# Patient Record
Sex: MALE | Race: WHITE | Employment: OTHER | URBAN - NONMETROPOLITAN AREA
[De-identification: names, ages, dates, MRNs, and addresses within clinical notes are randomized per-mention and may not be internally consistent; named-entity substitution may affect disease eponyms.]

---

## 2017-04-27 ENCOUNTER — NURSE ONLY (OUTPATIENT)
Dept: FAMILY MEDICINE CLINIC | Age: 72
End: 2017-04-27

## 2017-04-27 VITALS — SYSTOLIC BLOOD PRESSURE: 110 MMHG | DIASTOLIC BLOOD PRESSURE: 74 MMHG

## 2017-04-27 DIAGNOSIS — Z01.30 BLOOD PRESSURE CHECK: Primary | ICD-10-CM

## 2017-06-26 ENCOUNTER — OFFICE VISIT (OUTPATIENT)
Dept: FAMILY MEDICINE CLINIC | Age: 72
End: 2017-06-26

## 2017-06-26 VITALS
HEIGHT: 74 IN | HEART RATE: 70 BPM | BODY MASS INDEX: 27.62 KG/M2 | DIASTOLIC BLOOD PRESSURE: 80 MMHG | WEIGHT: 215.2 LBS | SYSTOLIC BLOOD PRESSURE: 132 MMHG

## 2017-06-26 DIAGNOSIS — R73.02 GLUCOSE INTOLERANCE (IMPAIRED GLUCOSE TOLERANCE): ICD-10-CM

## 2017-06-26 DIAGNOSIS — E78.00 PURE HYPERCHOLESTEROLEMIA: Primary | ICD-10-CM

## 2017-06-26 DIAGNOSIS — J02.9 SORE THROAT: ICD-10-CM

## 2017-06-26 PROCEDURE — 1036F TOBACCO NON-USER: CPT | Performed by: FAMILY MEDICINE

## 2017-06-26 PROCEDURE — 1123F ACP DISCUSS/DSCN MKR DOCD: CPT | Performed by: FAMILY MEDICINE

## 2017-06-26 PROCEDURE — 99214 OFFICE O/P EST MOD 30 MIN: CPT | Performed by: FAMILY MEDICINE

## 2017-06-26 PROCEDURE — 4040F PNEUMOC VAC/ADMIN/RCVD: CPT | Performed by: FAMILY MEDICINE

## 2017-06-26 PROCEDURE — 3017F COLORECTAL CA SCREEN DOC REV: CPT | Performed by: FAMILY MEDICINE

## 2017-06-26 PROCEDURE — G8427 DOCREV CUR MEDS BY ELIG CLIN: HCPCS | Performed by: FAMILY MEDICINE

## 2017-06-26 PROCEDURE — G8419 CALC BMI OUT NRM PARAM NOF/U: HCPCS | Performed by: FAMILY MEDICINE

## 2017-06-26 RX ORDER — LOVASTATIN 20 MG/1
20 TABLET ORAL DAILY
Qty: 90 TABLET | Refills: 1 | Status: SHIPPED | OUTPATIENT
Start: 2017-06-26 | End: 2017-12-14 | Stop reason: SDUPTHER

## 2017-06-26 RX ORDER — OMEPRAZOLE 40 MG/1
40 CAPSULE, DELAYED RELEASE ORAL DAILY
Qty: 30 CAPSULE | Refills: 0 | Status: SHIPPED | OUTPATIENT
Start: 2017-06-26 | End: 2017-07-24 | Stop reason: CLARIF

## 2017-06-26 ASSESSMENT — ENCOUNTER SYMPTOMS
SORE THROAT: 1
WHEEZING: 0
NAUSEA: 0
SHORTNESS OF BREATH: 0
TROUBLE SWALLOWING: 0
ABDOMINAL PAIN: 0
VOICE CHANGE: 1

## 2017-06-28 ENCOUNTER — TELEPHONE (OUTPATIENT)
Dept: FAMILY MEDICINE CLINIC | Age: 72
End: 2017-06-28

## 2017-06-28 LAB
AVERAGE GLUCOSE: 108 MG/DL (ref 66–114)
HBA1C MFR BLD: 5.4 % (ref 4.2–5.8)

## 2017-07-24 ENCOUNTER — OFFICE VISIT (OUTPATIENT)
Dept: FAMILY MEDICINE CLINIC | Age: 72
End: 2017-07-24
Payer: COMMERCIAL

## 2017-07-24 VITALS
HEART RATE: 80 BPM | SYSTOLIC BLOOD PRESSURE: 122 MMHG | BODY MASS INDEX: 28.18 KG/M2 | HEIGHT: 74 IN | DIASTOLIC BLOOD PRESSURE: 78 MMHG | WEIGHT: 219.6 LBS

## 2017-07-24 DIAGNOSIS — Z23 NEED FOR PNEUMOCOCCAL VACCINE: ICD-10-CM

## 2017-07-24 DIAGNOSIS — K21.9 GASTROESOPHAGEAL REFLUX DISEASE WITHOUT ESOPHAGITIS: Primary | ICD-10-CM

## 2017-07-24 DIAGNOSIS — J02.9 SORE THROAT: ICD-10-CM

## 2017-07-24 PROCEDURE — 3017F COLORECTAL CA SCREEN DOC REV: CPT | Performed by: FAMILY MEDICINE

## 2017-07-24 PROCEDURE — 90471 IMMUNIZATION ADMIN: CPT | Performed by: FAMILY MEDICINE

## 2017-07-24 PROCEDURE — 99212 OFFICE O/P EST SF 10 MIN: CPT | Performed by: FAMILY MEDICINE

## 2017-07-24 PROCEDURE — 1036F TOBACCO NON-USER: CPT | Performed by: FAMILY MEDICINE

## 2017-07-24 PROCEDURE — 1123F ACP DISCUSS/DSCN MKR DOCD: CPT | Performed by: FAMILY MEDICINE

## 2017-07-24 PROCEDURE — 90670 PCV13 VACCINE IM: CPT | Performed by: FAMILY MEDICINE

## 2017-07-24 PROCEDURE — G8427 DOCREV CUR MEDS BY ELIG CLIN: HCPCS | Performed by: FAMILY MEDICINE

## 2017-07-24 PROCEDURE — 4040F PNEUMOC VAC/ADMIN/RCVD: CPT | Performed by: FAMILY MEDICINE

## 2017-07-24 PROCEDURE — G8419 CALC BMI OUT NRM PARAM NOF/U: HCPCS | Performed by: FAMILY MEDICINE

## 2017-07-24 RX ORDER — ESOMEPRAZOLE MAGNESIUM 40 MG/1
40 CAPSULE, DELAYED RELEASE ORAL
COMMUNITY
End: 2017-12-14 | Stop reason: SDUPTHER

## 2017-12-01 ENCOUNTER — OFFICE VISIT (OUTPATIENT)
Dept: FAMILY MEDICINE CLINIC | Age: 72
End: 2017-12-01
Payer: COMMERCIAL

## 2017-12-01 VITALS
BODY MASS INDEX: 28.53 KG/M2 | DIASTOLIC BLOOD PRESSURE: 76 MMHG | SYSTOLIC BLOOD PRESSURE: 126 MMHG | HEART RATE: 80 BPM | WEIGHT: 222.2 LBS

## 2017-12-01 DIAGNOSIS — L08.9 FINGER INFECTION: Primary | ICD-10-CM

## 2017-12-01 PROCEDURE — 99213 OFFICE O/P EST LOW 20 MIN: CPT | Performed by: FAMILY MEDICINE

## 2017-12-01 RX ORDER — SULFAMETHOXAZOLE AND TRIMETHOPRIM 800; 160 MG/1; MG/1
1 TABLET ORAL 2 TIMES DAILY
Qty: 20 TABLET | Refills: 0 | Status: SHIPPED | OUTPATIENT
Start: 2017-12-01 | End: 2017-12-11

## 2017-12-01 NOTE — PROGRESS NOTES
[facility] PROFESSIONAL SERVS  [facility]  [address] [address]  Dept: [phone]  Dept Fax: [phone]  Loc: [phone]  PROGRESS NOTE      Visit Date: 12/1/2017    Shakila Estrella is a 67 y.o. male who presents today for:  Chief Complaint   Patient presents with    Finger Pain     Right Thumb pain x 1 week, denies injury, feels there may be something in it       Subjective:  HPI     Right thumb pain x 1 week. No injury. Having swelling and worsening pain. No drainage. No fever or chills. Has some redness. He states it is possible their could be a splinter in his finger but does not recall an episode. He tried digging in the tip of his finger last night. No numbness. Review of Systems  Past Medical History:   Diagnosis Date    Arthritis     Erectile dysfunction     Glucose intolerance (impaired glucose tolerance)     Hyperlipidemia     Renal cyst, left 7-2001    Dr. Masood Branham      Past Surgical History:   Procedure Laterality Date    COLONOSCOPY      TONSILLECTOMY      UPPER GASTROINTESTINAL ENDOSCOPY       Family History   Problem Relation Age of Onset    Cancer Mother     Alcohol Abuse Father     Cancer Sister     Diabetes Maternal Grandfather      Social History   Substance Use Topics    Smoking status: Never Smoker    Smokeless tobacco: Never Used    Alcohol use No      Current Outpatient Prescriptions   Medication Sig Dispense Refill    esomeprazole (NEXIUM) 40 MG delayed release capsule Take 40 mg by mouth every morning (before breakfast)      lovastatin (MEVACOR) 20 MG tablet Take 1 tablet by mouth daily 90 tablet 1    acyclovir (ZOVIRAX) 200 MG capsule One po qid prn cold sores 40 capsule 1    aspirin 81 MG tablet Take 81 mg by mouth daily      Naproxen Sodium (ALEVE PO) Take 1 tablet by mouth 2 times daily as needed       No current facility-administered medications for this visit.       No Known Allergies  Health

## 2017-12-14 ENCOUNTER — OFFICE VISIT (OUTPATIENT)
Dept: FAMILY MEDICINE CLINIC | Age: 72
End: 2017-12-14
Payer: COMMERCIAL

## 2017-12-14 VITALS
HEIGHT: 74 IN | HEART RATE: 70 BPM | DIASTOLIC BLOOD PRESSURE: 66 MMHG | WEIGHT: 215 LBS | BODY MASS INDEX: 27.59 KG/M2 | SYSTOLIC BLOOD PRESSURE: 108 MMHG

## 2017-12-14 DIAGNOSIS — E78.00 PURE HYPERCHOLESTEROLEMIA: Primary | ICD-10-CM

## 2017-12-14 DIAGNOSIS — K21.9 GASTROESOPHAGEAL REFLUX DISEASE WITHOUT ESOPHAGITIS: ICD-10-CM

## 2017-12-14 PROCEDURE — G8427 DOCREV CUR MEDS BY ELIG CLIN: HCPCS | Performed by: FAMILY MEDICINE

## 2017-12-14 PROCEDURE — 99213 OFFICE O/P EST LOW 20 MIN: CPT | Performed by: FAMILY MEDICINE

## 2017-12-14 PROCEDURE — 4040F PNEUMOC VAC/ADMIN/RCVD: CPT | Performed by: FAMILY MEDICINE

## 2017-12-14 PROCEDURE — 1123F ACP DISCUSS/DSCN MKR DOCD: CPT | Performed by: FAMILY MEDICINE

## 2017-12-14 PROCEDURE — 3017F COLORECTAL CA SCREEN DOC REV: CPT | Performed by: FAMILY MEDICINE

## 2017-12-14 PROCEDURE — 1036F TOBACCO NON-USER: CPT | Performed by: FAMILY MEDICINE

## 2017-12-14 PROCEDURE — G8417 CALC BMI ABV UP PARAM F/U: HCPCS | Performed by: FAMILY MEDICINE

## 2017-12-14 PROCEDURE — G8484 FLU IMMUNIZE NO ADMIN: HCPCS | Performed by: FAMILY MEDICINE

## 2017-12-14 RX ORDER — ESOMEPRAZOLE MAGNESIUM 40 MG/1
40 CAPSULE, DELAYED RELEASE ORAL
Qty: 90 CAPSULE | Refills: 1 | Status: SHIPPED | OUTPATIENT
Start: 2017-12-14 | End: 2018-06-28 | Stop reason: SDUPTHER

## 2017-12-14 RX ORDER — LOVASTATIN 20 MG/1
20 TABLET ORAL DAILY
Qty: 90 TABLET | Refills: 1 | Status: SHIPPED | OUTPATIENT
Start: 2017-12-14 | End: 2018-03-16 | Stop reason: SDUPTHER

## 2017-12-14 ASSESSMENT — PATIENT HEALTH QUESTIONNAIRE - PHQ9
1. LITTLE INTEREST OR PLEASURE IN DOING THINGS: 0
SUM OF ALL RESPONSES TO PHQ9 QUESTIONS 1 & 2: 0
2. FEELING DOWN, DEPRESSED OR HOPELESS: 0
SUM OF ALL RESPONSES TO PHQ QUESTIONS 1-9: 0

## 2017-12-14 NOTE — PROGRESS NOTES
Maintenance   Topic Date Due    DTaP/Tdap/Td vaccine (1 - Tdap) 06/18/2011    Flu vaccine (1) 09/01/2017    Pneumococcal low/med risk (2 of 2 - PPSV23) 07/24/2018    Lipid screen  09/30/2022    Colon cancer screen colonoscopy  01/27/2026    Zostavax vaccine  Completed       Objective:     /66 (Site: Left Arm, Position: Sitting, Cuff Size: Large Adult)   Pulse 70   Ht 6' 2\" (1.88 m)   Wt 215 lb (97.5 kg)   BMI 27.60 kg/m²   Physical Exam   Constitutional: He is oriented to person, place, and time. He appears well-developed and well-nourished. No distress. Cardiovascular: Normal rate and regular rhythm. Murmur heard. Pulmonary/Chest: Effort normal and breath sounds normal. No respiratory distress. He has no wheezes. Neurological: He is alert and oriented to person, place, and time. Psychiatric: He has a normal mood and affect. His behavior is normal.   Vitals reviewed. Lab Results   Component Value Date    WBC 7.3 09/30/2017    HGB 14.7 09/30/2017    HCT 43.1 09/30/2017     09/30/2017    CHOL 206 (H) 09/30/2017    TRIG 184 (H) 09/30/2017    HDL 71 09/30/2017    ALT 18 09/30/2017    AST 23 09/30/2017     09/30/2017    K 3.8 09/30/2017     09/30/2017    CREATININE 0.81 09/30/2017    BUN 19 09/30/2017    CO2 26 09/30/2017    TSH 2.40 09/27/2014    PSA 0.81 09/30/2017    LABA1C 5.2 09/30/2017       Impression/Plan:  1. Pure hypercholesterolemia  controlled  -refill lovastatin (MEVACOR) 20 MG tablet; Take 1 tablet by mouth daily  Dispense: 90 tablet; Refill: 1    2. Gastroesophageal reflux disease without esophagitis  controlled  -refill esomeprazole (NEXIUM) 40 MG delayed release capsule; Take 1 capsule by mouth every morning (before breakfast)  Dispense: 90 capsule; Refill: 1    Discussed options for OA management:  Ice, nsaid, steroid injections, PT, etc.  He will let us know if symptoms worsen and want more treatment. They voiced understanding.   All questions

## 2018-03-16 ENCOUNTER — TELEPHONE (OUTPATIENT)
Dept: FAMILY MEDICINE CLINIC | Age: 73
End: 2018-03-16

## 2018-03-16 DIAGNOSIS — E78.00 PURE HYPERCHOLESTEROLEMIA: ICD-10-CM

## 2018-03-16 RX ORDER — LOVASTATIN 20 MG/1
20 TABLET ORAL DAILY
Qty: 90 TABLET | Refills: 1 | Status: SHIPPED | OUTPATIENT
Start: 2018-03-16 | End: 2018-11-07 | Stop reason: SDUPTHER

## 2018-04-05 ENCOUNTER — HOSPITAL ENCOUNTER (EMERGENCY)
Age: 73
Discharge: HOME | End: 2018-04-05
Payer: COMMERCIAL

## 2018-04-05 VITALS
OXYGEN SATURATION: 97 % | TEMPERATURE: 97.6 F | SYSTOLIC BLOOD PRESSURE: 121 MMHG | DIASTOLIC BLOOD PRESSURE: 70 MMHG | RESPIRATION RATE: 16 BRPM | HEART RATE: 78 BPM

## 2018-04-05 DIAGNOSIS — L03.011 CELLULITIS OF FINGER OF RIGHT HAND: Primary | ICD-10-CM

## 2018-04-05 PROCEDURE — 90715 TDAP VACCINE 7 YRS/> IM: CPT | Performed by: NURSE PRACTITIONER

## 2018-04-05 PROCEDURE — 87070 CULTURE OTHR SPECIMN AEROBIC: CPT

## 2018-04-05 PROCEDURE — 6360000002 HC RX W HCPCS: Performed by: NURSE PRACTITIONER

## 2018-04-05 PROCEDURE — 99213 OFFICE O/P EST LOW 20 MIN: CPT | Performed by: NURSE PRACTITIONER

## 2018-04-05 PROCEDURE — 99213 OFFICE O/P EST LOW 20 MIN: CPT

## 2018-04-05 PROCEDURE — 87077 CULTURE AEROBIC IDENTIFY: CPT

## 2018-04-05 PROCEDURE — 90471 IMMUNIZATION ADMIN: CPT | Performed by: NURSE PRACTITIONER

## 2018-04-05 PROCEDURE — 10060 I&D ABSCESS SIMPLE/SINGLE: CPT

## 2018-04-05 PROCEDURE — 87184 SC STD DISK METHOD PER PLATE: CPT

## 2018-04-05 PROCEDURE — 87205 SMEAR GRAM STAIN: CPT

## 2018-04-05 RX ORDER — CLINDAMYCIN HYDROCHLORIDE 300 MG/1
300 CAPSULE ORAL 3 TIMES DAILY
Qty: 30 CAPSULE | Refills: 0 | Status: SHIPPED | OUTPATIENT
Start: 2018-04-05 | End: 2018-04-15

## 2018-04-05 RX ADMIN — TETANUS TOXOID, REDUCED DIPHTHERIA TOXOID AND ACELLULAR PERTUSSIS VACCINE, ADSORBED 0.5 ML: 5; 2.5; 8; 8; 2.5 SUSPENSION INTRAMUSCULAR at 17:13

## 2018-04-05 ASSESSMENT — ENCOUNTER SYMPTOMS
BACK PAIN: 0
SORE THROAT: 0
VOMITING: 0
DIARRHEA: 0
RHINORRHEA: 0
COUGH: 0
COLOR CHANGE: 1
EYE DISCHARGE: 0
ABDOMINAL PAIN: 0
CONSTIPATION: 0
SHORTNESS OF BREATH: 0
STRIDOR: 0
NAUSEA: 0
WHEEZING: 0
EYE PAIN: 0

## 2018-04-08 LAB
AEROBIC CULTURE: ABNORMAL
GRAM STAIN RESULT: ABNORMAL
ORGANISM: ABNORMAL

## 2018-04-10 ENCOUNTER — TELEPHONE (OUTPATIENT)
Dept: FAMILY MEDICINE CLINIC | Age: 73
End: 2018-04-10

## 2018-06-28 ENCOUNTER — TELEPHONE (OUTPATIENT)
Dept: FAMILY MEDICINE CLINIC | Age: 73
End: 2018-06-28

## 2018-06-28 ENCOUNTER — OFFICE VISIT (OUTPATIENT)
Dept: FAMILY MEDICINE CLINIC | Age: 73
End: 2018-06-28
Payer: COMMERCIAL

## 2018-06-28 VITALS
HEART RATE: 62 BPM | WEIGHT: 221 LBS | DIASTOLIC BLOOD PRESSURE: 68 MMHG | SYSTOLIC BLOOD PRESSURE: 110 MMHG | BODY MASS INDEX: 28.36 KG/M2 | HEIGHT: 74 IN

## 2018-06-28 DIAGNOSIS — E78.00 PURE HYPERCHOLESTEROLEMIA: Primary | ICD-10-CM

## 2018-06-28 DIAGNOSIS — K21.9 GASTROESOPHAGEAL REFLUX DISEASE WITHOUT ESOPHAGITIS: ICD-10-CM

## 2018-06-28 DIAGNOSIS — Z86.19 HISTORY OF COLD SORES: ICD-10-CM

## 2018-06-28 PROCEDURE — 3017F COLORECTAL CA SCREEN DOC REV: CPT | Performed by: FAMILY MEDICINE

## 2018-06-28 PROCEDURE — 99213 OFFICE O/P EST LOW 20 MIN: CPT | Performed by: FAMILY MEDICINE

## 2018-06-28 PROCEDURE — G8427 DOCREV CUR MEDS BY ELIG CLIN: HCPCS | Performed by: FAMILY MEDICINE

## 2018-06-28 PROCEDURE — G8417 CALC BMI ABV UP PARAM F/U: HCPCS | Performed by: FAMILY MEDICINE

## 2018-06-28 PROCEDURE — 1036F TOBACCO NON-USER: CPT | Performed by: FAMILY MEDICINE

## 2018-06-28 PROCEDURE — 1123F ACP DISCUSS/DSCN MKR DOCD: CPT | Performed by: FAMILY MEDICINE

## 2018-06-28 PROCEDURE — 4040F PNEUMOC VAC/ADMIN/RCVD: CPT | Performed by: FAMILY MEDICINE

## 2018-06-28 RX ORDER — ACYCLOVIR 200 MG/1
400 CAPSULE ORAL 3 TIMES DAILY
Qty: 60 CAPSULE | Refills: 0 | Status: SHIPPED | OUTPATIENT
Start: 2018-06-28 | End: 2020-05-05 | Stop reason: SDUPTHER

## 2018-06-28 RX ORDER — ESOMEPRAZOLE MAGNESIUM 40 MG/1
40 CAPSULE, DELAYED RELEASE ORAL
Qty: 90 CAPSULE | Refills: 1 | Status: SHIPPED | OUTPATIENT
Start: 2018-06-28 | End: 2018-11-07 | Stop reason: SDUPTHER

## 2018-06-28 ASSESSMENT — ENCOUNTER SYMPTOMS
WHEEZING: 0
SHORTNESS OF BREATH: 0

## 2018-10-05 ENCOUNTER — HOSPITAL ENCOUNTER (EMERGENCY)
Dept: GENERAL RADIOLOGY | Age: 73
Discharge: HOME | End: 2018-10-05
Payer: COMMERCIAL

## 2018-10-05 ENCOUNTER — HOSPITAL ENCOUNTER (EMERGENCY)
Age: 73
Discharge: HOME | End: 2018-10-05
Payer: COMMERCIAL

## 2018-10-05 VITALS
OXYGEN SATURATION: 96 % | TEMPERATURE: 97.8 F | RESPIRATION RATE: 14 BRPM | HEART RATE: 73 BPM | SYSTOLIC BLOOD PRESSURE: 135 MMHG | DIASTOLIC BLOOD PRESSURE: 85 MMHG

## 2018-10-05 DIAGNOSIS — S20.212A RIB CONTUSION, LEFT, INITIAL ENCOUNTER: Primary | ICD-10-CM

## 2018-10-05 PROCEDURE — 71101 X-RAY EXAM UNILAT RIBS/CHEST: CPT

## 2018-10-05 PROCEDURE — 99213 OFFICE O/P EST LOW 20 MIN: CPT

## 2018-10-05 PROCEDURE — 99214 OFFICE O/P EST MOD 30 MIN: CPT | Performed by: NURSE PRACTITIONER

## 2018-10-05 RX ORDER — COVID-19 ANTIGEN TEST
KIT MISCELLANEOUS
COMMUNITY

## 2018-10-05 ASSESSMENT — PAIN SCALES - GENERAL: PAINLEVEL_OUTOF10: 5

## 2018-10-05 ASSESSMENT — PAIN DESCRIPTION - FREQUENCY: FREQUENCY: CONTINUOUS

## 2018-10-05 ASSESSMENT — PAIN DESCRIPTION - ORIENTATION: ORIENTATION: LEFT

## 2018-10-05 ASSESSMENT — ENCOUNTER SYMPTOMS
NAUSEA: 0
VOMITING: 0

## 2018-10-05 ASSESSMENT — PAIN DESCRIPTION - PAIN TYPE: TYPE: ACUTE PAIN

## 2018-10-05 ASSESSMENT — PAIN DESCRIPTION - LOCATION: LOCATION: RIB CAGE

## 2018-10-05 ASSESSMENT — PAIN DESCRIPTION - DESCRIPTORS: DESCRIPTORS: PRESSURE

## 2018-10-05 NOTE — ED TRIAGE NOTES
Patient walked to room 7 for left rib pain onset last week, he was bent over picking up a golf ball on a golf cart and bent over into the handle. No other needs.

## 2018-11-05 DIAGNOSIS — E78.00 PURE HYPERCHOLESTEROLEMIA: ICD-10-CM

## 2018-11-05 RX ORDER — LOVASTATIN 20 MG/1
20 TABLET ORAL DAILY
Qty: 90 TABLET | Refills: 1 | OUTPATIENT
Start: 2018-11-05

## 2018-11-05 NOTE — TELEPHONE ENCOUNTER
Madi Bello called requesting a refill on the following medications:  Requested Prescriptions     Pending Prescriptions Disp Refills    lovastatin (MEVACOR) 20 MG tablet 90 tablet 1     Sig: Take 1 tablet by mouth daily     Pharmacy verified:  yes      Date of last visit: 6-28-18  Date of next visit (if applicable): 28/6/9793

## 2018-11-07 ENCOUNTER — OFFICE VISIT (OUTPATIENT)
Dept: FAMILY MEDICINE CLINIC | Age: 73
End: 2018-11-07
Payer: COMMERCIAL

## 2018-11-07 VITALS
WEIGHT: 229 LBS | HEART RATE: 72 BPM | BODY MASS INDEX: 29.39 KG/M2 | HEIGHT: 74 IN | DIASTOLIC BLOOD PRESSURE: 76 MMHG | SYSTOLIC BLOOD PRESSURE: 124 MMHG

## 2018-11-07 DIAGNOSIS — Z23 NEED FOR PNEUMOCOCCAL VACCINATION: ICD-10-CM

## 2018-11-07 DIAGNOSIS — E78.00 PURE HYPERCHOLESTEROLEMIA: Primary | ICD-10-CM

## 2018-11-07 DIAGNOSIS — K21.9 GASTROESOPHAGEAL REFLUX DISEASE WITHOUT ESOPHAGITIS: ICD-10-CM

## 2018-11-07 PROCEDURE — 1101F PT FALLS ASSESS-DOCD LE1/YR: CPT | Performed by: FAMILY MEDICINE

## 2018-11-07 PROCEDURE — 1036F TOBACCO NON-USER: CPT | Performed by: FAMILY MEDICINE

## 2018-11-07 PROCEDURE — 90732 PPSV23 VACC 2 YRS+ SUBQ/IM: CPT | Performed by: FAMILY MEDICINE

## 2018-11-07 PROCEDURE — G8417 CALC BMI ABV UP PARAM F/U: HCPCS | Performed by: FAMILY MEDICINE

## 2018-11-07 PROCEDURE — 3017F COLORECTAL CA SCREEN DOC REV: CPT | Performed by: FAMILY MEDICINE

## 2018-11-07 PROCEDURE — 4040F PNEUMOC VAC/ADMIN/RCVD: CPT | Performed by: FAMILY MEDICINE

## 2018-11-07 PROCEDURE — G8427 DOCREV CUR MEDS BY ELIG CLIN: HCPCS | Performed by: FAMILY MEDICINE

## 2018-11-07 PROCEDURE — 99213 OFFICE O/P EST LOW 20 MIN: CPT | Performed by: FAMILY MEDICINE

## 2018-11-07 PROCEDURE — G8484 FLU IMMUNIZE NO ADMIN: HCPCS | Performed by: FAMILY MEDICINE

## 2018-11-07 PROCEDURE — 1123F ACP DISCUSS/DSCN MKR DOCD: CPT | Performed by: FAMILY MEDICINE

## 2018-11-07 PROCEDURE — 90471 IMMUNIZATION ADMIN: CPT | Performed by: FAMILY MEDICINE

## 2018-11-07 RX ORDER — LOVASTATIN 20 MG/1
20 TABLET ORAL DAILY
Qty: 90 TABLET | Refills: 1 | Status: SHIPPED | OUTPATIENT
Start: 2018-11-07 | End: 2019-05-02 | Stop reason: SDUPTHER

## 2018-11-07 RX ORDER — ESOMEPRAZOLE MAGNESIUM 40 MG/1
40 CAPSULE, DELAYED RELEASE ORAL
Qty: 90 CAPSULE | Refills: 1 | Status: SHIPPED | OUTPATIENT
Start: 2018-11-07 | End: 2019-05-02 | Stop reason: SDUPTHER

## 2018-11-07 ASSESSMENT — PATIENT HEALTH QUESTIONNAIRE - PHQ9
SUM OF ALL RESPONSES TO PHQ QUESTIONS 1-9: 0
SUM OF ALL RESPONSES TO PHQ9 QUESTIONS 1 & 2: 0
1. LITTLE INTEREST OR PLEASURE IN DOING THINGS: 0
2. FEELING DOWN, DEPRESSED OR HOPELESS: 0
SUM OF ALL RESPONSES TO PHQ QUESTIONS 1-9: 0

## 2018-11-07 ASSESSMENT — ENCOUNTER SYMPTOMS
SHORTNESS OF BREATH: 0
WHEEZING: 0

## 2018-11-07 NOTE — PROGRESS NOTES
[facility]  [facility]  [address] [address]  Dept: [phone]  Dept Fax: [phone]  Loc: [phone]  PROGRESS NOTE      Visit Date: 11/7/2018    Vickey Gutierrez is a 67 y.o. male who presents today for:  Chief Complaint   Patient presents with    6 Month Follow-Up    Gastroesophageal Reflux    Hyperlipidemia       Subjective:  HPI     6 month follow-up hyperlipidemia:  He is on lovastatin. Also, he takes aspirin. No myalgias. GERD:  On nexium as needed. He takes it as needed       He asks that I do a rectal exam and palpate his prostate. Review of Systems   Respiratory: Negative for shortness of breath and wheezing. Cardiovascular: Negative for chest pain and leg swelling. Past Medical History:   Diagnosis Date    Arthritis     Erectile dysfunction     Glucose intolerance (impaired glucose tolerance)     Hyperlipidemia     Renal cyst, left 7-2001    Dr. Emeka Villasenor      Past Surgical History:   Procedure Laterality Date    COLONOSCOPY      TONSILLECTOMY      UPPER GASTROINTESTINAL ENDOSCOPY       Family History   Problem Relation Age of Onset    Cancer Mother     Alcohol Abuse Father     Cancer Sister     Diabetes Maternal Grandfather      Social History   Substance Use Topics    Smoking status: Never Smoker    Smokeless tobacco: Never Used    Alcohol use No      Current Outpatient Prescriptions   Medication Sig Dispense Refill    Naproxen Sodium (ALEVE) 220 MG CAPS Take by mouth      esomeprazole (NEXIUM) 40 MG delayed release capsule Take 1 capsule by mouth every morning (before breakfast) 90 capsule 1    lovastatin (MEVACOR) 20 MG tablet Take 1 tablet by mouth daily 90 tablet 1    aspirin 81 MG tablet Take 81 mg by mouth daily       No current facility-administered medications for this visit.       No Known Allergies  Health Maintenance   Topic Date Due    Shingles Vaccine (1 of 2 - 2 Dose Series)

## 2019-04-07 PROBLEM — R73.03 PRE-DIABETES: Status: ACTIVE | Noted: 2019-04-07

## 2019-05-02 ENCOUNTER — OFFICE VISIT (OUTPATIENT)
Dept: FAMILY MEDICINE CLINIC | Age: 74
End: 2019-05-02
Payer: COMMERCIAL

## 2019-05-02 VITALS
HEIGHT: 74 IN | WEIGHT: 228.6 LBS | BODY MASS INDEX: 29.34 KG/M2 | HEART RATE: 72 BPM | SYSTOLIC BLOOD PRESSURE: 132 MMHG | DIASTOLIC BLOOD PRESSURE: 80 MMHG

## 2019-05-02 DIAGNOSIS — Z91.81 AT HIGH RISK FOR FALLS: ICD-10-CM

## 2019-05-02 DIAGNOSIS — S76.301A RIGHT HAMSTRING INJURY, INITIAL ENCOUNTER: ICD-10-CM

## 2019-05-02 DIAGNOSIS — K21.9 GASTROESOPHAGEAL REFLUX DISEASE WITHOUT ESOPHAGITIS: ICD-10-CM

## 2019-05-02 DIAGNOSIS — R73.03 PRE-DIABETES: ICD-10-CM

## 2019-05-02 DIAGNOSIS — E78.00 PURE HYPERCHOLESTEROLEMIA: Primary | ICD-10-CM

## 2019-05-02 DIAGNOSIS — N52.8 OTHER MALE ERECTILE DYSFUNCTION: ICD-10-CM

## 2019-05-02 PROCEDURE — 4040F PNEUMOC VAC/ADMIN/RCVD: CPT | Performed by: FAMILY MEDICINE

## 2019-05-02 PROCEDURE — G8417 CALC BMI ABV UP PARAM F/U: HCPCS | Performed by: FAMILY MEDICINE

## 2019-05-02 PROCEDURE — 99214 OFFICE O/P EST MOD 30 MIN: CPT | Performed by: FAMILY MEDICINE

## 2019-05-02 PROCEDURE — 1123F ACP DISCUSS/DSCN MKR DOCD: CPT | Performed by: FAMILY MEDICINE

## 2019-05-02 PROCEDURE — 1036F TOBACCO NON-USER: CPT | Performed by: FAMILY MEDICINE

## 2019-05-02 PROCEDURE — 3017F COLORECTAL CA SCREEN DOC REV: CPT | Performed by: FAMILY MEDICINE

## 2019-05-02 PROCEDURE — G8427 DOCREV CUR MEDS BY ELIG CLIN: HCPCS | Performed by: FAMILY MEDICINE

## 2019-05-02 RX ORDER — SILDENAFIL 50 MG/1
50 TABLET, FILM COATED ORAL DAILY PRN
Qty: 10 TABLET | Refills: 0 | Status: SHIPPED | OUTPATIENT
Start: 2019-05-02 | End: 2020-11-27

## 2019-05-02 RX ORDER — ESOMEPRAZOLE MAGNESIUM 40 MG/1
40 CAPSULE, DELAYED RELEASE ORAL
Qty: 90 CAPSULE | Refills: 1 | Status: SHIPPED | OUTPATIENT
Start: 2019-05-02 | End: 2019-11-04 | Stop reason: SDUPTHER

## 2019-05-02 RX ORDER — LOVASTATIN 20 MG/1
20 TABLET ORAL DAILY
Qty: 90 TABLET | Refills: 1 | Status: SHIPPED | OUTPATIENT
Start: 2019-05-02 | End: 2019-11-04 | Stop reason: SDUPTHER

## 2019-05-02 ASSESSMENT — PATIENT HEALTH QUESTIONNAIRE - PHQ9
2. FEELING DOWN, DEPRESSED OR HOPELESS: 0
SUM OF ALL RESPONSES TO PHQ QUESTIONS 1-9: 0
SUM OF ALL RESPONSES TO PHQ QUESTIONS 1-9: 0
SUM OF ALL RESPONSES TO PHQ9 QUESTIONS 1 & 2: 0
1. LITTLE INTEREST OR PLEASURE IN DOING THINGS: 0

## 2019-05-02 ASSESSMENT — ENCOUNTER SYMPTOMS
WHEEZING: 0
SHORTNESS OF BREATH: 0

## 2019-05-02 NOTE — PROGRESS NOTES
[facility] PROFESSIONAL SERVS  [facility]  [address] [address]  Dept: [phone]  Dept Fax: [phone]  Loc: [phone]  PROGRESS NOTE      Visit Date: 5/2/2019    Brett Sorensen is a 75 y.o. male who presents today for:  Chief Complaint   Patient presents with    Hyperlipidemia     6 month check up       Subjective:  Hyperlipidemia   Pertinent negatives include no chest pain or shortness of breath. 6 month follow-up hyperlipidemia:  He is on lovastatin. Also, he takes aspirin. No myalgias. LDL was 86 in April. GERD:  On nexium as needed. He takes it as needed   No abd pain or nausea. Pre-diabetes:  a1c 5.9% in April. New problems:  right buttock pain that started after last night when he fell playing pickleball. He has stretched.      erectile dysfunction: He is unable to obtain an erection. He has not tried medication in the past.  He denies any chest pain. He wants to know his options. Review of Systems   Respiratory: Negative for shortness of breath and wheezing. Cardiovascular: Negative for chest pain and leg swelling. Neurological: Negative for dizziness and syncope.      Past Medical History:   Diagnosis Date    Arthritis     Erectile dysfunction     Glucose intolerance (impaired glucose tolerance)     Hyperlipidemia     Renal cyst, left 7-2001    Dr. Dipti Brooks      Past Surgical History:   Procedure Laterality Date    COLONOSCOPY      TONSILLECTOMY      UPPER GASTROINTESTINAL ENDOSCOPY       Family History   Problem Relation Age of Onset    Cancer Mother     Alcohol Abuse Father     Cancer Sister     Diabetes Maternal Grandfather      Social History     Tobacco Use    Smoking status: Never Smoker    Smokeless tobacco: Never Used   Substance Use Topics    Alcohol use: No      Current Outpatient Medications   Medication Sig Dispense Refill    esomeprazole (NEXIUM) 40 MG delayed release capsule Take 1 capsule by mouth every morning (before breakfast) 90 capsule 1    lovastatin (MEVACOR) 20 MG tablet Take 1 tablet by mouth daily 90 tablet 1    Naproxen Sodium (ALEVE) 220 MG CAPS Take by mouth      aspirin 81 MG tablet Take 81 mg by mouth daily       No current facility-administered medications for this visit. No Known Allergies  Health Maintenance   Topic Date Due    Shingles Vaccine (1 of 2) 11/28/1994    A1C test (Diabetic or Prediabetic)  04/06/2020    Lipid screen  04/06/2024    Colon cancer screen colonoscopy  01/27/2026    DTaP/Tdap/Td vaccine (2 - Td) 04/05/2028    Flu vaccine  Completed    Pneumococcal 65+ years Vaccine  Completed       Objective:  /80 (Site: Right Upper Arm, Position: Sitting, Cuff Size: Medium Adult)   Pulse 72   Ht 6' 2\" (1.88 m)   Wt 228 lb 9.6 oz (103.7 kg)   BMI 29.35 kg/m²   Physical Exam   Constitutional: He is oriented to person, place, and time. He appears well-developed and well-nourished. No distress. Cardiovascular: Normal rate and regular rhythm. No murmur heard. Pulmonary/Chest: Effort normal and breath sounds normal. No respiratory distress. He has no wheezes. Neurological: He is alert and oriented to person, place, and time. Psychiatric: He has a normal mood and affect. His behavior is normal.   Vitals reviewed. Right hip:  ttp of ischial tuberosity. 4+/5 knee flexion strength. Lab Results   Component Value Date    WBC 4.9 04/06/2019    HGB 15.4 04/06/2019    HCT 45.5 04/06/2019     04/06/2019    CHOL 181 04/06/2019    TRIG 123 04/06/2019    HDL 70 04/06/2019    ALT 23 04/06/2019    AST 23 04/06/2019     04/06/2019    K 4.1 04/06/2019     04/06/2019    CREATININE 0.94 04/06/2019    BUN 22 (H) 04/06/2019    CO2 24 04/06/2019    TSH 2.40 09/27/2014    PSA 0.49 04/06/2019    LABA1C 5.9 04/06/2019     Impression/Plan:  1. Pure hypercholesterolemia  Well-controlled. Chronic condition. Refill medication(s).   LDL

## 2019-05-02 NOTE — PATIENT INSTRUCTIONS
Patient Education        Hamstring Strain: Rehab Exercises  Your Care Instructions  Here are some examples of typical rehabilitation exercises for your condition. Start each exercise slowly. Ease off the exercise if you start to have pain. Your doctor or physical therapist will tell you when you can start these exercises and which ones will work best for you. How to do the exercises  Hamstring set (heel dig)    1. Sit with your affected leg bent. Your good leg should be straight and supported on the floor. 2. Tighten the muscles on the back of your bent leg (hamstring) by pressing your heel into the floor. 3. Hold for about 6 seconds, and then rest for up to 10 seconds. 4. Repeat 8 to 12 times. Hamstring curl    1. Lie on your stomach with your knees straight. Place a pillow under your stomach. If your kneecap is uncomfortable, roll up a washcloth and put it under your leg just above your kneecap. 2. Lift the foot of your affected leg by bending your knee so that you bring your foot up toward your buttock. If this motion hurts, try it without bending your knee quite as far. This may help you avoid any painful motion. 3. Slowly move your leg up and down. 4. Repeat 8 to 12 times. 5. When you can do this exercise with ease and no pain, add some resistance. To do this:  6. Tie the ends of an exercise band together to form a loop. Attach one end of the loop to a secure object or shut a door on it to hold it in place. (Or you can have someone hold one end of the loop to provide resistance.)  7. Loop the other end of the exercise band around the lower part of your affected leg. 8. Repeat steps 1 through 4, slowly pulling back on the exercise band with your leg. Hip extension    1. Stand facing a wall with your hands on the wall at about chest level. 2. Keeping the knee of your affected leg straight, kick that leg straight back behind you.   3. Relax, and lower your leg back to the starting seconds. 4. Repeat 2 to 4 times. 5. Repeat steps 1 through 4, but this time keep your back knee bent. Single-leg balance    1. Stand on a flat surface with your arms stretched out to your sides like you are making the letter \"T. \" Then lift your good leg off the floor, bending it at the knee. If you are not steady on your feet, use one hand to hold on to a chair, counter, or wall. 2. Standing on your affected leg, keep that knee straight. Try to balance on that leg for up to 30 seconds. Then rest for up to 10 seconds. 3. Repeat 6 to 8 times. 4. When you can balance on your affected leg for 30 seconds with your eyes open, try to balance on it with your eyes closed. 5. When you can do this exercise with your eyes closed for 30 seconds and with ease and no pain, try standing on a pillow or piece of foam, and repeat steps 1 through 4. Follow-up care is a key part of your treatment and safety. Be sure to make and go to all appointments, and call your doctor if you are having problems. It's also a good idea to know your test results and keep a list of the medicines you take. Where can you learn more? Go to https://[employer]eb.[employer] org and sign in to your Pick1 account. Enter [phone] in the [employer] box to learn more about \"Hamstring Strain: Rehab Exercises. \"     If you do not have an account, please click on the \"Sign Up Now\" link. Current as of: September 20, 2018  Content Version: 11.9  © 6776-1494 [employer]. Care instructions adapted under license by [facility] ([facility]). If you have questions about a medical condition or this instruction, always ask your healthcare professional. [address] any warranty or liability for your use of this information.

## 2019-09-18 ENCOUNTER — OFFICE VISIT (OUTPATIENT)
Dept: FAMILY MEDICINE CLINIC | Age: 74
End: 2019-09-18
Payer: COMMERCIAL

## 2019-09-18 VITALS
DIASTOLIC BLOOD PRESSURE: 80 MMHG | HEIGHT: 74 IN | SYSTOLIC BLOOD PRESSURE: 128 MMHG | HEART RATE: 76 BPM | BODY MASS INDEX: 29.26 KG/M2 | WEIGHT: 228 LBS

## 2019-09-18 DIAGNOSIS — R07.81 RIB PAIN ON RIGHT SIDE: Primary | ICD-10-CM

## 2019-09-18 DIAGNOSIS — M54.6 ACUTE RIGHT-SIDED THORACIC BACK PAIN: ICD-10-CM

## 2019-09-18 PROCEDURE — 99213 OFFICE O/P EST LOW 20 MIN: CPT | Performed by: FAMILY MEDICINE

## 2019-09-18 PROCEDURE — 1123F ACP DISCUSS/DSCN MKR DOCD: CPT | Performed by: FAMILY MEDICINE

## 2019-09-18 PROCEDURE — G8427 DOCREV CUR MEDS BY ELIG CLIN: HCPCS | Performed by: FAMILY MEDICINE

## 2019-09-18 PROCEDURE — 3017F COLORECTAL CA SCREEN DOC REV: CPT | Performed by: FAMILY MEDICINE

## 2019-09-18 PROCEDURE — 4040F PNEUMOC VAC/ADMIN/RCVD: CPT | Performed by: FAMILY MEDICINE

## 2019-09-18 PROCEDURE — G8417 CALC BMI ABV UP PARAM F/U: HCPCS | Performed by: FAMILY MEDICINE

## 2019-09-18 PROCEDURE — 1036F TOBACCO NON-USER: CPT | Performed by: FAMILY MEDICINE

## 2019-09-18 NOTE — PATIENT INSTRUCTIONS
Patient Education        Back Stretches: Exercises  Introduction  Here are some examples of exercises for stretching your back. Start each exercise slowly. Ease off the exercise if you start to have pain. Your doctor or physical therapist will tell you when you can start these exercises and which ones will work best for you. How to do the exercises  Overhead stretch    1. Stand comfortably with your feet shoulder-width apart. 2. Looking straight ahead, raise both arms over your head and reach toward the ceiling. Do not allow your head to tilt back. 3. Hold for 15 to 30 seconds, then lower your arms to your sides. 4. Repeat 2 to 4 times. Side stretch    1. Stand comfortably with your feet shoulder-width apart. 2. Raise one arm over your head, and then lean to the other side. 3. Slide your hand down your leg as you let the weight of your arm gently stretch your side muscles. Hold for 15 to 30 seconds. 4. Repeat 2 to 4 times on each side. Press-up    1. Lie on your stomach, supporting your body with your forearms. 2. Press your elbows down into the floor to raise your upper back. As you do this, relax your stomach muscles and allow your back to arch without using your back muscles. As your press up, do not let your hips or pelvis come off the floor. 3. Hold for 15 to 30 seconds, then relax. 4. Repeat 2 to 4 times. Relax and rest    1. Lie on your back with a rolled towel under your neck and a pillow under your knees. Extend your arms comfortably to your sides. 2. Relax and breathe normally. 3. Remain in this position for about 10 minutes. 4. If you can, do this 2 or 3 times each day. Follow-up care is a key part of your treatment and safety. Be sure to make and go to all appointments, and call your doctor if you are having problems. It's also a good idea to know your test results and keep a list of the medicines you take. Where can you learn more?   Go to

## 2019-09-28 LAB
ALBUMIN SERPL-MCNC: 4.5 G/DL
ALP BLD-CCNC: 67 U/L
ALT SERPL-CCNC: 15 U/L
ANION GAP SERPL CALCULATED.3IONS-SCNC: 10 MMOL/L
AST SERPL-CCNC: 17 U/L
AVERAGE GLUCOSE: 120
BASOPHILS ABSOLUTE: 0 /ΜL
BASOPHILS RELATIVE PERCENT: 0.8 %
BILIRUB SERPL-MCNC: 0.1 MG/DL (ref 0.1–1.4)
BUN BLDV-MCNC: 18 MG/DL
CALCIUM SERPL-MCNC: 9.7 MG/DL
CHLORIDE BLD-SCNC: 104 MMOL/L
CHOLESTEROL, TOTAL: 187 MG/DL
CHOLESTEROL/HDL RATIO: 3
CO2: 26 MMOL/L
CREAT SERPL-MCNC: 0.84 MG/DL
EOSINOPHILS ABSOLUTE: 200 /ΜL
EOSINOPHILS RELATIVE PERCENT: 2.8 %
GFR CALCULATED: >60
GLUCOSE BLD-MCNC: 108 MG/DL
HBA1C MFR BLD: 5.8 %
HCT VFR BLD CALC: 45.6 % (ref 41–53)
HDLC SERPL-MCNC: 62 MG/DL (ref 35–70)
HEMOGLOBIN: 15.5 G/DL (ref 13.5–17.5)
LDL CHOLESTEROL CALCULATED: 103 MG/DL (ref 0–160)
LYMPHOCYTES ABSOLUTE: 1600 /ΜL
LYMPHOCYTES RELATIVE PERCENT: 28.8 %
MCH RBC QN AUTO: 32.2 PG
MCHC RBC AUTO-ENTMCNC: 34 G/DL
MCV RBC AUTO: 94.9 FL
MONOCYTES ABSOLUTE: 400 /ΜL
MONOCYTES RELATIVE PERCENT: 7.1 %
NEUTROPHILS ABSOLUTE: 3300 /ΜL
NEUTROPHILS RELATIVE PERCENT: 60.5 %
PDW BLD-RTO: 13.2 %
PLATELET # BLD: 212 K/ΜL
PMV BLD AUTO: NORMAL FL
POTASSIUM SERPL-SCNC: 4.1 MMOL/L
PROSTATE SPECIFIC ANTIGEN: 0.58 NG/ML
RBC # BLD: 4.8 10^6/ΜL
SODIUM BLD-SCNC: 140 MMOL/L
TOTAL PROTEIN: 7.2
TRIGL SERPL-MCNC: 108 MG/DL
VLDLC SERPL CALC-MCNC: 21 MG/DL
WBC # BLD: 5.4 10^3/ML

## 2019-10-18 ENCOUNTER — NURSE ONLY (OUTPATIENT)
Dept: FAMILY MEDICINE CLINIC | Age: 74
End: 2019-10-18
Payer: COMMERCIAL

## 2019-10-18 DIAGNOSIS — Z23 NEED FOR SHINGLES VACCINE: Primary | ICD-10-CM

## 2019-10-18 PROCEDURE — 90471 IMMUNIZATION ADMIN: CPT | Performed by: FAMILY MEDICINE

## 2019-10-18 PROCEDURE — 90750 HZV VACC RECOMBINANT IM: CPT | Performed by: FAMILY MEDICINE

## 2019-11-04 ENCOUNTER — OFFICE VISIT (OUTPATIENT)
Dept: FAMILY MEDICINE CLINIC | Age: 74
End: 2019-11-04
Payer: COMMERCIAL

## 2019-11-04 VITALS
SYSTOLIC BLOOD PRESSURE: 132 MMHG | HEART RATE: 61 BPM | DIASTOLIC BLOOD PRESSURE: 82 MMHG | HEIGHT: 74 IN | BODY MASS INDEX: 28.75 KG/M2 | WEIGHT: 224 LBS

## 2019-11-04 DIAGNOSIS — Z23 INFLUENZA VACCINE NEEDED: ICD-10-CM

## 2019-11-04 DIAGNOSIS — K21.9 GASTROESOPHAGEAL REFLUX DISEASE WITHOUT ESOPHAGITIS: Primary | ICD-10-CM

## 2019-11-04 DIAGNOSIS — R10.9 BILATERAL FLANK PAIN: ICD-10-CM

## 2019-11-04 DIAGNOSIS — E78.00 PURE HYPERCHOLESTEROLEMIA: ICD-10-CM

## 2019-11-04 PROCEDURE — 90653 IIV ADJUVANT VACCINE IM: CPT | Performed by: FAMILY MEDICINE

## 2019-11-04 PROCEDURE — 4040F PNEUMOC VAC/ADMIN/RCVD: CPT | Performed by: FAMILY MEDICINE

## 2019-11-04 PROCEDURE — 1123F ACP DISCUSS/DSCN MKR DOCD: CPT | Performed by: FAMILY MEDICINE

## 2019-11-04 PROCEDURE — 90471 IMMUNIZATION ADMIN: CPT | Performed by: FAMILY MEDICINE

## 2019-11-04 PROCEDURE — G8417 CALC BMI ABV UP PARAM F/U: HCPCS | Performed by: FAMILY MEDICINE

## 2019-11-04 PROCEDURE — 3288F FALL RISK ASSESSMENT DOCD: CPT | Performed by: FAMILY MEDICINE

## 2019-11-04 PROCEDURE — G8427 DOCREV CUR MEDS BY ELIG CLIN: HCPCS | Performed by: FAMILY MEDICINE

## 2019-11-04 PROCEDURE — 99214 OFFICE O/P EST MOD 30 MIN: CPT | Performed by: FAMILY MEDICINE

## 2019-11-04 PROCEDURE — G8482 FLU IMMUNIZE ORDER/ADMIN: HCPCS | Performed by: FAMILY MEDICINE

## 2019-11-04 PROCEDURE — 0518F FALL PLAN OF CARE DOCD: CPT | Performed by: FAMILY MEDICINE

## 2019-11-04 PROCEDURE — 1036F TOBACCO NON-USER: CPT | Performed by: FAMILY MEDICINE

## 2019-11-04 PROCEDURE — 3017F COLORECTAL CA SCREEN DOC REV: CPT | Performed by: FAMILY MEDICINE

## 2019-11-04 RX ORDER — LOVASTATIN 20 MG/1
20 TABLET ORAL DAILY
Qty: 90 TABLET | Refills: 1 | Status: SHIPPED | OUTPATIENT
Start: 2019-11-04 | End: 2020-05-26 | Stop reason: SDUPTHER

## 2019-11-04 RX ORDER — ESOMEPRAZOLE MAGNESIUM 40 MG/1
40 CAPSULE, DELAYED RELEASE ORAL
Qty: 90 CAPSULE | Refills: 1 | Status: SHIPPED | OUTPATIENT
Start: 2019-11-04 | End: 2020-05-26 | Stop reason: SDUPTHER

## 2019-11-04 ASSESSMENT — ENCOUNTER SYMPTOMS
SHORTNESS OF BREATH: 0
WHEEZING: 0

## 2019-11-05 ENCOUNTER — TELEPHONE (OUTPATIENT)
Dept: FAMILY MEDICINE CLINIC | Age: 74
End: 2019-11-05

## 2019-11-05 DIAGNOSIS — R10.9 BILATERAL FLANK PAIN: Primary | ICD-10-CM

## 2019-11-06 DIAGNOSIS — R10.9 BILATERAL FLANK PAIN: ICD-10-CM

## 2019-11-06 LAB
BACTERIA: NORMAL
BILIRUBIN URINE: NEGATIVE
BLOOD, URINE: NEGATIVE
CASTS: NORMAL /LPF
CASTS: NORMAL /LPF
CHARACTER, URINE: CLEAR
COLOR: YELLOW
CRYSTALS: NORMAL
EPITHELIAL CELLS, UA: NORMAL /HPF
GLUCOSE, URINE: NEGATIVE MG/DL
KETONES, URINE: NEGATIVE
LEUKOCYTE ESTERASE, URINE: NEGATIVE
MISCELLANEOUS LAB TEST RESULT: NORMAL
NITRITE, URINE: NEGATIVE
PH UA: 6 (ref 5–9)
PROTEIN UA: NEGATIVE MG/DL
RBC URINE: NORMAL /HPF
RENAL EPITHELIAL, UA: NORMAL
SPECIFIC GRAVITY UA: 1.02 (ref 1–1.03)
UROBILINOGEN, URINE: 0.2 EU/DL (ref 0–1)
WBC UA: NORMAL /HPF
YEAST: NORMAL

## 2019-11-07 ENCOUNTER — HOSPITAL ENCOUNTER (OUTPATIENT)
Dept: ULTRASOUND IMAGING | Age: 74
Discharge: HOME | End: 2019-11-07
Payer: COMMERCIAL

## 2019-11-07 DIAGNOSIS — R10.9 BILATERAL FLANK PAIN: ICD-10-CM

## 2019-11-07 PROBLEM — N28.1 RENAL CYSTS, ACQUIRED, BILATERAL: Status: ACTIVE | Noted: 2019-11-07

## 2019-11-07 PROCEDURE — 76770 US EXAM ABDO BACK WALL COMP: CPT

## 2019-11-08 ENCOUNTER — TELEPHONE (OUTPATIENT)
Dept: FAMILY MEDICINE CLINIC | Age: 74
End: 2019-11-08

## 2019-11-08 DIAGNOSIS — R10.9 BILATERAL FLANK PAIN: Primary | ICD-10-CM

## 2020-01-15 ENCOUNTER — NURSE ONLY (OUTPATIENT)
Dept: FAMILY MEDICINE CLINIC | Age: 75
End: 2020-01-15
Payer: COMMERCIAL

## 2020-01-15 PROCEDURE — 90471 IMMUNIZATION ADMIN: CPT | Performed by: FAMILY MEDICINE

## 2020-01-15 PROCEDURE — 90750 HZV VACC RECOMBINANT IM: CPT | Performed by: FAMILY MEDICINE

## 2020-01-15 NOTE — PROGRESS NOTES
After obtaining consent, and per orders of Jayda Alonzo MD, Immunization(s) given during visit:    Immunizations Administered     Name Date Dose Route    Zoster Recombinant (Shingrix) 1/15/2020 0.5 mL Intramuscular    Site: Deltoid- Left    Lot: [id number]    NDC: 79615-531-80

## 2020-05-04 ENCOUNTER — TELEPHONE (OUTPATIENT)
Dept: FAMILY MEDICINE CLINIC | Age: 75
End: 2020-05-04

## 2020-05-04 NOTE — TELEPHONE ENCOUNTER
Pt states he used to take a medication to decrease canker sores, pt states he is getting them for whatever reason and would like the medication again [facility]acy [facility]

## 2020-05-05 RX ORDER — ACYCLOVIR 200 MG/1
400 CAPSULE ORAL 3 TIMES DAILY
Qty: 60 CAPSULE | Refills: 0 | Status: SHIPPED | OUTPATIENT
Start: 2020-05-05 | End: 2020-06-17 | Stop reason: SDUPTHER

## 2020-05-26 ENCOUNTER — VIRTUAL VISIT (OUTPATIENT)
Dept: FAMILY MEDICINE CLINIC | Age: 75
End: 2020-05-26
Payer: COMMERCIAL

## 2020-05-26 PROCEDURE — 0518F FALL PLAN OF CARE DOCD: CPT | Performed by: FAMILY MEDICINE

## 2020-05-26 PROCEDURE — 99214 OFFICE O/P EST MOD 30 MIN: CPT | Performed by: FAMILY MEDICINE

## 2020-05-26 PROCEDURE — 3288F FALL RISK ASSESSMENT DOCD: CPT | Performed by: FAMILY MEDICINE

## 2020-05-26 PROCEDURE — 3017F COLORECTAL CA SCREEN DOC REV: CPT | Performed by: FAMILY MEDICINE

## 2020-05-26 PROCEDURE — 4040F PNEUMOC VAC/ADMIN/RCVD: CPT | Performed by: FAMILY MEDICINE

## 2020-05-26 PROCEDURE — 1123F ACP DISCUSS/DSCN MKR DOCD: CPT | Performed by: FAMILY MEDICINE

## 2020-05-26 PROCEDURE — G8427 DOCREV CUR MEDS BY ELIG CLIN: HCPCS | Performed by: FAMILY MEDICINE

## 2020-05-26 RX ORDER — LOVASTATIN 20 MG/1
20 TABLET ORAL DAILY
Qty: 90 TABLET | Refills: 1 | Status: SHIPPED | OUTPATIENT
Start: 2020-05-26 | End: 2020-11-27 | Stop reason: SDUPTHER

## 2020-05-26 RX ORDER — ESOMEPRAZOLE MAGNESIUM 40 MG/1
40 CAPSULE, DELAYED RELEASE ORAL
Qty: 90 CAPSULE | Refills: 1 | Status: SHIPPED | OUTPATIENT
Start: 2020-05-26 | End: 2020-11-27 | Stop reason: SDUPTHER

## 2020-05-26 ASSESSMENT — ENCOUNTER SYMPTOMS
ABDOMINAL PAIN: 0
SHORTNESS OF BREATH: 0
BLOOD IN STOOL: 0
WHEEZING: 0
SORE THROAT: 0

## 2020-05-26 NOTE — PROGRESS NOTES
2020    TELEHEALTH EVALUATION -- Audio/Visual (During [facility] [place] public health emergency)    HPI:    Silva Sanchez (:  1945) has requested an audio/video evaluation for the following concern(s):    6 month f/u    hyperlipidemia:  He is on lovastatin. Also, he takes aspirin. No myalgias. LDL was 103 in sept     GERD:  On nexium as needed. He has not taken nexium lately. No abd pain or nausea.        New problems:  1. Sometimes his throat bothers him. No symptoms currently. He attributes it to not taking nexium. 2.  Left knee pain: Gets left knee pain with exercise. Reports some clicking. Plays golf. He has small amount of pain when it clicks. Pain is on medial aspect of left knee. No injury. Worse when going down stairs. Intentional wt loss. Wt is about 200 lbs. Review of Systems   HENT: Negative for sore throat. Respiratory: Negative for shortness of breath and wheezing. Cardiovascular: Negative for chest pain and leg swelling. Gastrointestinal: Negative for abdominal pain and blood in stool. Musculoskeletal: Positive for arthralgias. Negative for joint swelling. Prior to Visit Medications    Medication Sig Taking?  Authorizing Provider   lovastatin (MEVACOR) 20 MG tablet Take 1 tablet by mouth daily Yes Gustavo Mai MD   Naproxen Sodium (ALEVE) 220 MG CAPS Take by mouth Yes Historical Provider, MD   aspirin 81 MG tablet Take 81 mg by mouth daily Yes Historical Provider, MD   esomeprazole (NEXIUM) 40 MG delayed release capsule Take 1 capsule by mouth every morning (before breakfast)  Patient not taking: Reported on 2020  Gustavo Mai MD   sildenafil (VIAGRA) 50 MG tablet Take 1 tablet by mouth daily as needed for Erectile Dysfunction  Patient not taking: Reported on 2020  Gustavo Mai MD       Social History     Tobacco Use    Smoking status: Never Smoker    Smokeless tobacco: Never Used   Substance Use Topics    Alcohol use: No    diagnoses include meniscus tear, arthritis, chondromalacia, tendinopathy, etc.  Recommend x-ray. Continue with being active. F/u in office in a few weeks for eval. Continue aleve once daily  - XR KNEE LEFT (MIN 4 VIEWS); Future      Return in about 4 weeks (around 6/23/2020) for left knee pain (in office). Denisse Pedraza is a 75 y.o. male being evaluated by a Virtual Visit (video visit) encounter to address concerns as mentioned above. A caregiver was present when appropriate. Due to this being a TeleHealth encounter (During [facility] public health emergency), evaluation of the following organ systems was limited: Vitals/Constitutional/EENT/Resp/CV/GI//MS/Neuro/Skin/Heme-Lymph-Imm. Pursuant to the emergency declaration under the [address] [place] [address], [address] [place] [address] authority and the [employer] and [employer] Act, this Virtual Visit was conducted with patient's (and/or legal guardian's) consent, to reduce the patient's risk of exposure to COVID-19 and provide necessary medical care. The patient (and/or legal guardian) has also been advised to contact this office for worsening conditions or problems, and seek emergency medical treatment and/or call 911 if deemed necessary. Patient identification was verified at the start of the visit: Yes    Total time spent on this encounter: Not billed by time    Services were provided through a video synchronous discussion virtually to substitute for in-person clinic visit. Patient and provider were located at their individual homes. --Jania Landa MD on 5/26/2020 at 7:30 AM    An electronic signature was used to authenticate this note.

## 2020-06-17 RX ORDER — ACYCLOVIR 200 MG/1
400 CAPSULE ORAL 3 TIMES DAILY
Qty: 60 CAPSULE | Refills: 0 | Status: SHIPPED | OUTPATIENT
Start: 2020-06-17 | End: 2020-06-27

## 2020-07-10 RX ORDER — LOVASTATIN 20 MG/1
20 TABLET ORAL DAILY
Qty: 90 TABLET | Refills: 1 | OUTPATIENT
Start: 2020-07-10

## 2020-07-10 NOTE — TELEPHONE ENCOUNTER
Maxwell Alberto called requesting a refill on the following medications:  Requested Prescriptions     Pending Prescriptions Disp Refills    lovastatin (MEVACOR) 20 MG tablet 90 tablet 1     Sig: Take 1 tablet by mouth daily     Pharmacy verified:[facility]   . pv      Date of last visit: 05/26/20  Date of next visit (if applicable): Visit date not found

## 2020-11-05 ENCOUNTER — NURSE ONLY (OUTPATIENT)
Dept: FAMILY MEDICINE CLINIC | Age: 75
End: 2020-11-05
Payer: COMMERCIAL

## 2020-11-05 PROCEDURE — 90694 VACC AIIV4 NO PRSRV 0.5ML IM: CPT | Performed by: FAMILY MEDICINE

## 2020-11-05 PROCEDURE — 90471 IMMUNIZATION ADMIN: CPT | Performed by: FAMILY MEDICINE

## 2020-11-13 ENCOUNTER — OFFICE VISIT (OUTPATIENT)
Dept: FAMILY MEDICINE CLINIC | Age: 75
End: 2020-11-13
Payer: COMMERCIAL

## 2020-11-13 VITALS
OXYGEN SATURATION: 98 % | BODY MASS INDEX: 27.46 KG/M2 | HEIGHT: 74 IN | WEIGHT: 214 LBS | SYSTOLIC BLOOD PRESSURE: 118 MMHG | TEMPERATURE: 96.3 F | HEART RATE: 83 BPM | DIASTOLIC BLOOD PRESSURE: 82 MMHG

## 2020-11-13 PROCEDURE — 1123F ACP DISCUSS/DSCN MKR DOCD: CPT | Performed by: FAMILY MEDICINE

## 2020-11-13 PROCEDURE — 99213 OFFICE O/P EST LOW 20 MIN: CPT | Performed by: FAMILY MEDICINE

## 2020-11-13 PROCEDURE — 4040F PNEUMOC VAC/ADMIN/RCVD: CPT | Performed by: FAMILY MEDICINE

## 2020-11-13 PROCEDURE — G8427 DOCREV CUR MEDS BY ELIG CLIN: HCPCS | Performed by: FAMILY MEDICINE

## 2020-11-13 PROCEDURE — 3017F COLORECTAL CA SCREEN DOC REV: CPT | Performed by: FAMILY MEDICINE

## 2020-11-13 PROCEDURE — G8484 FLU IMMUNIZE NO ADMIN: HCPCS | Performed by: FAMILY MEDICINE

## 2020-11-13 PROCEDURE — G8417 CALC BMI ABV UP PARAM F/U: HCPCS | Performed by: FAMILY MEDICINE

## 2020-11-13 PROCEDURE — 1036F TOBACCO NON-USER: CPT | Performed by: FAMILY MEDICINE

## 2020-11-13 RX ORDER — POLYETHYLENE GLYCOL 3350 17 G/17G
17 POWDER, FOR SOLUTION ORAL DAILY PRN
Qty: 578 G | Refills: 0 | Status: SHIPPED | OUTPATIENT
Start: 2020-11-13 | End: 2020-11-27

## 2020-11-13 ASSESSMENT — ENCOUNTER SYMPTOMS
BLOOD IN STOOL: 1
VOMITING: 0
DIARRHEA: 0
ABDOMINAL PAIN: 0
CONSTIPATION: 1
NAUSEA: 0

## 2020-11-13 ASSESSMENT — PATIENT HEALTH QUESTIONNAIRE - PHQ9
1. LITTLE INTEREST OR PLEASURE IN DOING THINGS: 0
2. FEELING DOWN, DEPRESSED OR HOPELESS: 0
SUM OF ALL RESPONSES TO PHQ9 QUESTIONS 1 & 2: 0
SUM OF ALL RESPONSES TO PHQ QUESTIONS 1-9: 0

## 2020-11-13 NOTE — PATIENT INSTRUCTIONS
Patient Education        Constipation: Care Instructions  Your Care Instructions     Constipation means that you have a hard time passing stools (bowel movements). People pass stools from 3 times a day to once every 3 days. What is normal for you may be different. Constipation may occur with pain in the rectum and cramping. The pain may get worse when you try to pass stools. Sometimes there are small amounts of bright red blood on toilet paper or the surface of stools. This is because of enlarged veins near the rectum (hemorrhoids). A few changes in your diet and lifestyle may help you avoid ongoing constipation. Your doctor may also prescribe medicine to help loosen your stool. Some medicines can cause constipation. These include pain medicines and antidepressants. Tell your doctor about all the medicines you take. Your doctor may want to make a medicine change to ease your symptoms. Follow-up care is a key part of your treatment and safety. Be sure to make and go to all appointments, and call your doctor if you are having problems. It's also a good idea to know your test results and keep a list of the medicines you take. How can you care for yourself at home? · Drink plenty of fluids, enough so that your urine is light yellow or clear like water. If you have kidney, heart, or liver disease and have to limit fluids, talk with your doctor before you increase the amount of fluids you drink. · Include high-fiber foods in your diet each day. These include fruits, vegetables, beans, and whole grains. · Get at least 30 minutes of exercise on most days of the week. Walking is a good choice. You also may want to do other activities, such as running, swimming, cycling, or playing tennis or team sports. · Take a fiber supplement, such as Citrucel or Metamucil, every day. Read and follow all instructions on the label. · Schedule time each day for a bowel movement. A daily routine may help.  Take your time having your bowel movement. · Support your feet with a small step stool when you sit on the toilet. This helps flex your hips and places your pelvis in a squatting position. · Your doctor may recommend an over-the-counter laxative to relieve your constipation. Examples are Milk of Magnesia and MiraLax. Read and follow all instructions on the label. Do not use laxatives on a long-term basis. When should you call for help? Call your doctor now or seek immediate medical care if:    · You have new or worse belly pain.     · You have new or worse nausea or vomiting.     · You have blood in your stools. Watch closely for changes in your health, and be sure to contact your doctor if:    · Your constipation is getting worse.     · You do not get better as expected. Where can you learn more? Go to https://HangItjammieeb.[employer]. org and sign in to your MobileCause account. Enter [phone] in the K121 box to learn more about \"Constipation: Care Instructions. \"     If you do not have an account, please click on the \"Sign Up Now\" link. Current as of: June 26, 2019               Content Version: 12.6  © 3186-0571 [employer]. Care instructions adapted under license by [facility] ([facility]). If you have questions about a medical condition or this instruction, always ask your healthcare professional. [address] any warranty or liability for your use of this information.

## 2020-11-13 NOTE — PROGRESS NOTES
[facility]  [facility]  [address] [facility]  Dept: [phone]  Dept Fax: [phone]  Loc: [phone]  PROGRESS NOTE      Visit Date: 11/13/2020    Cat Davis is a 75 y.o. male who presents today for:  Chief Complaint   Patient presents with    Constipation     x 5 days    Rectal Bleeding       Subjective:  HPI    Constipation x 5 days. He had blood on tissue paper after BMs this week. Was worse a few days ago. He attributes his BM changes due to stopping drinking coffee 5 months ago and now eating cereal in the mornings. Hardening of stool for about 6 months. Has BM every 1-2 days. No rectal pain. No palpable bulges. Last colonoscopy was in 2016 and no ulcers. He tried stool softener the past few days without relief. On aspirin. Review of Systems   Constitutional: Negative for chills and fever. Gastrointestinal: Positive for blood in stool and constipation. Negative for abdominal pain, diarrhea, nausea and vomiting. Neurological: Negative for dizziness, syncope and light-headedness. Past Medical History:   Diagnosis Date    Arthritis     Erectile dysfunction     Glucose intolerance (impaired glucose tolerance)     Hyperlipidemia     Renal cyst, left 7-2001    Dr. Margarette Sprague      Current Outpatient Medications   Medication Sig Dispense Refill    lovastatin (MEVACOR) 20 MG tablet Take 1 tablet by mouth daily 90 tablet 1    esomeprazole (NEXIUM) 40 MG delayed release capsule Take 1 capsule by mouth every morning (before breakfast) 90 capsule 1    Naproxen Sodium (ALEVE) 220 MG CAPS Take by mouth      aspirin 81 MG tablet Take 81 mg by mouth daily      sildenafil (VIAGRA) 50 MG tablet Take 1 tablet by mouth daily as needed for Erectile Dysfunction (Patient not taking: Reported on 5/26/2020) 10 tablet 0     No current facility-administered medications for this visit.       No Known Allergies    Objective:     /82 (Site: Left Upper Arm, Position: Sitting)   Pulse 83   Temp 96.3 °F (35.7 °C)   Ht 6' 2\" (1.88 m)   Wt 214 lb (97.1 kg)   SpO2 98%   BMI 27.48 kg/m²   Physical Exam  Vitals signs reviewed. Constitutional:       General: He is not in acute distress. Appearance: He is not ill-appearing. Abdominal:      General: There is no distension. Palpations: Abdomen is soft. Tenderness: There is no abdominal tenderness. There is no guarding or rebound. Neurological:      Mental Status: He is alert. Mental status is at baseline. Psychiatric:         Mood and Affect: Mood normal.         Behavior: Behavior normal.     He declines rectal exam    Impression/Plan:  1. Other constipation  New problem with a little bit of blood on the tissue paper. Likely due to internal hemorrhoids. As he has no pain with this. Colonoscopy is up-to-date. Start MiraLAX and titrate as needed for soft stools. Continue high-fiber diet  - polyethylene glycol (GLYCOLAX) 17 GM/SCOOP powder; Take 17 g by mouth daily as needed (constipation)  Dispense: 578 g; Refill: 0    2. Pure hypercholesterolemia  Ordered blood test  - Lipid Panel; Future    3. Elevated glucose  Ordered blood test  - Hemoglobin A1C; Future    4. Screening for diabetes mellitus  - Comprehensive Metabolic Panel; Future  - Hemoglobin A1C; Future    5. Screening for prostate cancer  - PSA Screening; Future        They voiced understanding. All questions answered. They agreed with treatment plan. See patient instructions for any educational materials that may have been given. Discussed use, benefit, and side effects of prescribed medications. (Please note that portions of this note may have been completed with a voice recognition program.  Efforts were made to edit the dictation but occasionally words are mis-transcribed.)    Return in about 2 weeks (around 11/27/2020) for left knee and cholesterol. Electronically signed by Natalee Barton MD on 11/13/2020 at 7:51 AM

## 2020-11-20 ENCOUNTER — HOSPITAL ENCOUNTER (OUTPATIENT)
Age: 75
Discharge: HOME | End: 2020-11-20
Payer: COMMERCIAL

## 2020-11-20 ENCOUNTER — HOSPITAL ENCOUNTER (OUTPATIENT)
Dept: GENERAL RADIOLOGY | Age: 75
Discharge: HOME | End: 2020-11-20
Payer: COMMERCIAL

## 2020-11-20 DIAGNOSIS — Z12.5 SCREENING FOR PROSTATE CANCER: ICD-10-CM

## 2020-11-20 DIAGNOSIS — Z13.1 SCREENING FOR DIABETES MELLITUS: ICD-10-CM

## 2020-11-20 DIAGNOSIS — E78.00 PURE HYPERCHOLESTEROLEMIA: ICD-10-CM

## 2020-11-20 DIAGNOSIS — R73.09 ELEVATED GLUCOSE: ICD-10-CM

## 2020-11-20 LAB
ALBUMIN SERPL-MCNC: 4.6 G/DL (ref 3.5–5.1)
ALP BLD-CCNC: 82 U/L (ref 38–126)
ALT SERPL-CCNC: 11 U/L (ref 11–66)
ANION GAP SERPL CALCULATED.3IONS-SCNC: 11 MEQ/L (ref 8–16)
AST SERPL-CCNC: 16 U/L (ref 5–40)
AVERAGE GLUCOSE: 111 MG/DL (ref 70–126)
BILIRUB SERPL-MCNC: 0.6 MG/DL (ref 0.3–1.2)
BUN BLDV-MCNC: 20 MG/DL (ref 7–22)
CALCIUM SERPL-MCNC: 10 MG/DL (ref 8.5–10.5)
CHLORIDE BLD-SCNC: 104 MEQ/L (ref 98–111)
CHOLESTEROL, TOTAL: 175 MG/DL (ref 100–199)
CO2: 28 MEQ/L (ref 23–33)
CREAT SERPL-MCNC: 0.9 MG/DL (ref 0.4–1.2)
GFR SERPL CREATININE-BSD FRML MDRD: 82 ML/MIN/1.73M2
GLUCOSE BLD-MCNC: 112 MG/DL (ref 70–108)
HBA1C MFR BLD: 5.7 % (ref 4.4–6.4)
HDLC SERPL-MCNC: 58 MG/DL
LDL CHOLESTEROL CALCULATED: 95 MG/DL
POTASSIUM SERPL-SCNC: 4.9 MEQ/L (ref 3.5–5.2)
PROSTATE SPECIFIC ANTIGEN: 0.81 NG/ML (ref 0–1)
SODIUM BLD-SCNC: 143 MEQ/L (ref 135–145)
TOTAL PROTEIN: 7.3 G/DL (ref 6.1–8)
TRIGL SERPL-MCNC: 108 MG/DL (ref 0–199)

## 2020-11-20 PROCEDURE — 73564 X-RAY EXAM KNEE 4 OR MORE: CPT

## 2020-11-20 PROCEDURE — 83036 HEMOGLOBIN GLYCOSYLATED A1C: CPT

## 2020-11-20 PROCEDURE — 80053 COMPREHEN METABOLIC PANEL: CPT

## 2020-11-20 PROCEDURE — 36415 COLL VENOUS BLD VENIPUNCTURE: CPT

## 2020-11-20 PROCEDURE — G0103 PSA SCREENING: HCPCS

## 2020-11-20 PROCEDURE — 80061 LIPID PANEL: CPT

## 2020-11-27 ENCOUNTER — OFFICE VISIT (OUTPATIENT)
Dept: FAMILY MEDICINE CLINIC | Age: 75
End: 2020-11-27
Payer: COMMERCIAL

## 2020-11-27 VITALS
SYSTOLIC BLOOD PRESSURE: 138 MMHG | OXYGEN SATURATION: 99 % | DIASTOLIC BLOOD PRESSURE: 82 MMHG | HEART RATE: 58 BPM | WEIGHT: 231.3 LBS | HEIGHT: 74 IN | BODY MASS INDEX: 29.69 KG/M2 | TEMPERATURE: 97.3 F

## 2020-11-27 PROBLEM — M17.12 PRIMARY OSTEOARTHRITIS OF LEFT KNEE: Status: ACTIVE | Noted: 2020-11-27

## 2020-11-27 PROCEDURE — G8484 FLU IMMUNIZE NO ADMIN: HCPCS | Performed by: FAMILY MEDICINE

## 2020-11-27 PROCEDURE — G8427 DOCREV CUR MEDS BY ELIG CLIN: HCPCS | Performed by: FAMILY MEDICINE

## 2020-11-27 PROCEDURE — G8417 CALC BMI ABV UP PARAM F/U: HCPCS | Performed by: FAMILY MEDICINE

## 2020-11-27 PROCEDURE — 99214 OFFICE O/P EST MOD 30 MIN: CPT | Performed by: FAMILY MEDICINE

## 2020-11-27 PROCEDURE — 1123F ACP DISCUSS/DSCN MKR DOCD: CPT | Performed by: FAMILY MEDICINE

## 2020-11-27 PROCEDURE — 4040F PNEUMOC VAC/ADMIN/RCVD: CPT | Performed by: FAMILY MEDICINE

## 2020-11-27 PROCEDURE — 1036F TOBACCO NON-USER: CPT | Performed by: FAMILY MEDICINE

## 2020-11-27 PROCEDURE — 3017F COLORECTAL CA SCREEN DOC REV: CPT | Performed by: FAMILY MEDICINE

## 2020-11-27 RX ORDER — ESOMEPRAZOLE MAGNESIUM 40 MG/1
40 CAPSULE, DELAYED RELEASE ORAL
Qty: 90 CAPSULE | Refills: 1 | Status: SHIPPED | OUTPATIENT
Start: 2020-11-27 | End: 2021-06-01 | Stop reason: SDUPTHER

## 2020-11-27 RX ORDER — LOVASTATIN 20 MG/1
20 TABLET ORAL DAILY
Qty: 90 TABLET | Refills: 1 | Status: SHIPPED | OUTPATIENT
Start: 2020-11-27 | End: 2021-06-01 | Stop reason: SDUPTHER

## 2020-11-27 ASSESSMENT — ENCOUNTER SYMPTOMS
SHORTNESS OF BREATH: 0
ABDOMINAL PAIN: 0
NAUSEA: 0
WHEEZING: 0

## 2020-11-27 NOTE — PROGRESS NOTES
[facility]  [address] [facility]  Dept: [phone]  Dept Fax: [phone]  Loc: [phone]  PROGRESS NOTE      Visit Date: 11/27/2020    Mary Land is a 75 y.o. male who presents today for:  Chief Complaint   Patient presents with    2 Week Follow-Up    Hyperlipidemia       Subjective:  HPI     6 month f/u  Left knee pain: Discussed this with him 6 months ago. He had x-rays done about 1 week ago. He is taking aleve. Reports medial knee pain. He reports weakness. He has quit tennis due to this. He can play golf but it bothers him. Pain is worse with going up and down steps. No locking or giving out. No swelling. Hypercholesterolemia: On lovastatin. No myalgias. LDL of 95 about 2 weeks ago    GERD:  On nexium as needed. He has not taken nexium in past 2 weeks.  No abd pain or nausea. Review of Systems   Constitutional: Negative for chills and fever. Respiratory: Negative for shortness of breath and wheezing. Gastrointestinal: Negative for abdominal pain and nausea. Musculoskeletal: Positive for arthralgias. Negative for joint swelling.      Patient Active Problem List   Diagnosis    History of cold sores    Hyperlipidemia    Glucose intolerance (impaired glucose tolerance)    Hematochezia    Constipation    Pre-diabetes    Renal cysts, acquired, bilateral     Past Medical History:   Diagnosis Date    Arthritis     Erectile dysfunction     Glucose intolerance (impaired glucose tolerance)     Hyperlipidemia     Renal cyst, left 7-2001    Dr. Thomas Saeed      Past Surgical History:   Procedure Laterality Date    COLONOSCOPY      TONSILLECTOMY      UPPER GASTROINTESTINAL ENDOSCOPY       Family History   Problem Relation Age of Onset    Cancer Mother     Alcohol Abuse Father     Cancer Sister     Diabetes Maternal Grandfather      Social History     Tobacco Use    Smoking status: Never Smoker    Smokeless tobacco: Never Used   Substance Use Topics    Alcohol use: No      Current Outpatient Medications   Medication Sig Dispense Refill    lovastatin (MEVACOR) 20 MG tablet Take 1 tablet by mouth daily 90 tablet 1    esomeprazole (NEXIUM) 40 MG delayed release capsule Take 1 capsule by mouth every morning (before breakfast) 90 capsule 1    Naproxen Sodium (ALEVE) 220 MG CAPS Take by mouth      aspirin 81 MG tablet Take 81 mg by mouth daily      polyethylene glycol (GLYCOLAX) 17 GM/SCOOP powder Take 17 g by mouth daily as needed (constipation) (Patient not taking: Reported on 11/27/2020) 578 g 0    sildenafil (VIAGRA) 50 MG tablet Take 1 tablet by mouth daily as needed for Erectile Dysfunction (Patient not taking: Reported on 5/26/2020) 10 tablet 0     No current facility-administered medications for this visit. No Known Allergies  Health Maintenance   Topic Date Due    Annual Wellness Visit (AWV)  05/29/2019    A1C test (Diabetic or Prediabetic)  11/20/2021    Lipid screen  11/20/2021    Colon cancer screen colonoscopy  01/27/2026    DTaP/Tdap/Td vaccine (2 - Td) 04/05/2028    Flu vaccine  Completed    Shingles Vaccine  Completed    Pneumococcal 65+ years Vaccine  Completed    Hepatitis A vaccine  Aged Out    Hepatitis B vaccine  Aged Out    Hib vaccine  Aged Out    Meningococcal (ACWY) vaccine  Aged Out       Objective:  /82 (Site: Left Upper Arm, Position: Sitting)   Pulse 58   Temp 97.3 °F (36.3 °C)   Ht 6' 2\" (1.88 m)   Wt 231 lb 4.8 oz (104.9 kg)   SpO2 99%   BMI 29.70 kg/m²   Physical Exam  Vitals signs reviewed. Constitutional:       General: He is not in acute distress. Appearance: He is not ill-appearing. Cardiovascular:      Rate and Rhythm: Normal rate and regular rhythm. Heart sounds: No murmur. Pulmonary:      Effort: Pulmonary effort is normal. No respiratory distress. Breath sounds: No wheezing or rhonchi.    Neurological: of left knee  As above    3. Pure hypercholesterolemia  chronic  Controlled with LDL of 95. Refill med  - lovastatin (MEVACOR) 20 MG tablet; Take 1 tablet by mouth daily  Dispense: 90 tablet; Refill: 1    4. Gastroesophageal reflux disease without esophagitis  Chronic. Well-controlled. Refill med  - esomeprazole (NEXIUM) 40 MG delayed release capsule; Take 1 capsule by mouth every morning (before breakfast)  Dispense: 90 capsule; Refill: 1      They voiced understanding. All questions answered. They agreed with treatment plan. See patient instructions for any educational materials that may have been given. Discussed use, benefit, and side effects of prescribed medications. Reviewed health maintenance. (Please note that portions of this note may have been completed with a voice recognition program.  Efforts were made to edit the dictation but occasionally words are mis-transcribed.)    Return in about 6 months (around 5/27/2021) for medicare wellness and cholesterol.       Electronically signed by Lisette Lehay MD on 11/27/2020 at 8:09 AM

## 2020-11-27 NOTE — PATIENT INSTRUCTIONS
Patient Education        Knee Arthritis: Exercises  Introduction  Here are some examples of exercises for you to try. The exercises may be suggested for a condition or for rehabilitation. Start each exercise slowly. Ease off the exercises if you start to have pain. You will be told when to start these exercises and which ones will work best for you. How to do the exercises  Knee flexion with heel slide   1. Lie on your back with your knees bent. 2. Slide your heel back by bending your affected knee as far as you can. Then hook your other foot around your ankle to help pull your heel even farther back. 3. Hold for about 6 seconds, then rest for up to 10 seconds. 4. Repeat 8 to 12 times. 5. Switch legs and repeat steps 1 through 4, even if only one knee is sore. Quad sets   1. Sit with your affected leg straight and supported on the floor or a firm bed. Place a small, rolled-up towel under your knee. Your other leg should be bent, with that foot flat on the floor. 2. Tighten the thigh muscles of your affected leg by pressing the back of your knee down into the towel. 3. Hold for about 6 seconds, then rest for up to 10 seconds. 4. Repeat 8 to 12 times. 5. Switch legs and repeat steps 1 through 4, even if only one knee is sore. Straight-leg raises to the front   1. Lie on your back with your good knee bent so that your foot rests flat on the floor. Your affected leg should be straight. Make sure that your low back has a normal curve. You should be able to slip your hand in between the floor and the small of your back, with your palm touching the floor and your back touching the back of your hand. 2. Tighten the thigh muscles in your affected leg by pressing the back of your knee flat down to the floor. Hold your knee straight. 3. Keeping the thigh muscles tight and your leg straight, lift your affected leg up so that your heel is about 12 inches off the floor.  Hold for about 6 seconds, then lower slowly. 4. Relax for up to 10 seconds between repetitions. 5. Repeat 8 to 12 times. 6. Switch legs and repeat steps 1 through 5, even if only one knee is sore. Active knee flexion   1. Lie on your stomach with your knees straight. If your kneecap is uncomfortable, roll up a washcloth and put it under your leg just above your kneecap. 2. Lift the foot of your affected leg by bending the knee so that you bring the foot up toward your buttock. If this motion hurts, try it without bending your knee quite as far. This may help you avoid any painful motion. 3. Slowly move your leg up and down. 4. Repeat 8 to 12 times. 5. Switch legs and repeat steps 1 through 4, even if only one knee is sore. Quadriceps stretch (facedown)   1. Lie flat on your stomach, and rest your face on the floor. 2. Wrap a towel or belt strap around the lower part of your affected leg. Then use the towel or belt strap to slowly pull your heel toward your buttock until you feel a stretch. 3. Hold for about 15 to 30 seconds, then relax your leg against the towel or belt strap. 4. Repeat 2 to 4 times. 5. Switch legs and repeat steps 1 through 4, even if only one knee is sore. Stationary exercise bike   1. If you do not have a stationary exercise bike at home, you can find one to ride at your local health club or community center. 2. Adjust the height of the bike seat so that your knee is slightly bent when your leg is extended downward. If your knee hurts when the pedal reaches the top, you can raise the seat so that your knee does not bend as much. 3. Start slowly. At first, try to do 5 to 10 minutes of cycling with little to no resistance. Then increase your time and the resistance bit by bit until you can do 20 to 30 minutes without pain. 4. If you start to have pain, rest your knee until your pain gets back to the level that is normal for you. Or cycle for less time or with less effort.     Follow-up care is a key part of your treatment and safety. Be sure to make and go to all appointments, and call your doctor if you are having problems. It's also a good idea to know your test results and keep a list of the medicines you take. Where can you learn more? Go to [email or web address]://gilleseb.sones. org and sign in to your PeakÂ® account. Enter C159 in the Emergency CallWorks box to learn more about \"Knee Arthritis: Exercises. \"     If you do not have an account, please click on the \"Sign Up Now\" link. Current as of: March 2, 2020               Content Version: 12.6  © 9902-4646 [employer]. Care instructions adapted under license by [facility] ([facility]). If you have questions about a medical condition or this instruction, always ask your healthcare professional. [address] any warranty or liability for your use of this information.

## 2021-06-01 ENCOUNTER — OFFICE VISIT (OUTPATIENT)
Dept: FAMILY MEDICINE CLINIC | Age: 76
End: 2021-06-01
Payer: COMMERCIAL

## 2021-06-01 VITALS
DIASTOLIC BLOOD PRESSURE: 82 MMHG | BODY MASS INDEX: 28.52 KG/M2 | OXYGEN SATURATION: 98 % | TEMPERATURE: 97.8 F | SYSTOLIC BLOOD PRESSURE: 138 MMHG | HEART RATE: 80 BPM | HEIGHT: 74 IN | WEIGHT: 222.2 LBS | RESPIRATION RATE: 16 BRPM

## 2021-06-01 DIAGNOSIS — L98.9 SKIN LESION OF FOOT: ICD-10-CM

## 2021-06-01 DIAGNOSIS — R04.0 EPISTAXIS: ICD-10-CM

## 2021-06-01 DIAGNOSIS — K21.9 GASTROESOPHAGEAL REFLUX DISEASE WITHOUT ESOPHAGITIS: Primary | ICD-10-CM

## 2021-06-01 DIAGNOSIS — E78.00 PURE HYPERCHOLESTEROLEMIA: ICD-10-CM

## 2021-06-01 PROCEDURE — 1123F ACP DISCUSS/DSCN MKR DOCD: CPT | Performed by: FAMILY MEDICINE

## 2021-06-01 PROCEDURE — 4040F PNEUMOC VAC/ADMIN/RCVD: CPT | Performed by: FAMILY MEDICINE

## 2021-06-01 PROCEDURE — 99214 OFFICE O/P EST MOD 30 MIN: CPT | Performed by: FAMILY MEDICINE

## 2021-06-01 PROCEDURE — G8427 DOCREV CUR MEDS BY ELIG CLIN: HCPCS | Performed by: FAMILY MEDICINE

## 2021-06-01 PROCEDURE — 1036F TOBACCO NON-USER: CPT | Performed by: FAMILY MEDICINE

## 2021-06-01 PROCEDURE — G8417 CALC BMI ABV UP PARAM F/U: HCPCS | Performed by: FAMILY MEDICINE

## 2021-06-01 RX ORDER — LOVASTATIN 20 MG/1
20 TABLET ORAL DAILY
Qty: 90 TABLET | Refills: 1 | Status: SHIPPED | OUTPATIENT
Start: 2021-06-01 | End: 2021-12-02 | Stop reason: SDUPTHER

## 2021-06-01 RX ORDER — ESOMEPRAZOLE MAGNESIUM 40 MG/1
40 CAPSULE, DELAYED RELEASE ORAL
Qty: 90 CAPSULE | Refills: 1 | Status: SHIPPED | OUTPATIENT
Start: 2021-06-01 | End: 2021-12-02 | Stop reason: SDUPTHER

## 2021-06-01 ASSESSMENT — ENCOUNTER SYMPTOMS
SHORTNESS OF BREATH: 0
WHEEZING: 0
BLOOD IN STOOL: 0
ABDOMINAL PAIN: 0

## 2021-06-01 ASSESSMENT — PATIENT HEALTH QUESTIONNAIRE - PHQ9
SUM OF ALL RESPONSES TO PHQ9 QUESTIONS 1 & 2: 0
2. FEELING DOWN, DEPRESSED OR HOPELESS: 0
SUM OF ALL RESPONSES TO PHQ QUESTIONS 1-9: 0
SUM OF ALL RESPONSES TO PHQ QUESTIONS 1-9: 0
1. LITTLE INTEREST OR PLEASURE IN DOING THINGS: 0
SUM OF ALL RESPONSES TO PHQ QUESTIONS 1-9: 0

## 2021-06-01 NOTE — PROGRESS NOTES
[facility]  [facility]  [address] [facility]  Dept: [phone]  Dept Fax: [phone]  Loc: [phone]  PROGRESS NOTE      Visit Date: 6/1/2021    Sami Pan is a 67 y.o. male who presents today for:  Chief Complaint   Patient presents with    6 Month Follow-Up    Hyperlipidemia       Subjective:  HPI     6 month f/u    Hypercholesterolemia: On lovastatin. No myalgias. LDL of 95 about 6 months ago.     GERD:  On nexium as needed. No abd pain or nausea.      3 questions:  1. Right 5th MTP with healing skin  2. Intermittent bloody nose in spring. Had 1 last week. Always on left side  3. Bilateral flank discomfort. He does golf. No urinary symptoms. Played golf this weekend    Review of Systems   Constitutional: Negative for chills and fever. HENT: Positive for nosebleeds. Respiratory: Negative for shortness of breath and wheezing. Cardiovascular: Negative for chest pain. Gastrointestinal: Negative for abdominal pain and blood in stool. Genitourinary: Negative for dysuria and frequency. Skin: Negative for rash and wound.      Patient Active Problem List   Diagnosis    History of cold sores    Hyperlipidemia    Glucose intolerance (impaired glucose tolerance)    Hematochezia    Constipation    Pre-diabetes    Renal cysts, acquired, bilateral    Primary osteoarthritis of left knee     Past Medical History:   Diagnosis Date    Arthritis     Erectile dysfunction     Glucose intolerance (impaired glucose tolerance)     Hyperlipidemia     Renal cyst, left 7-2001    Dr. Lesia Velasquez      Past Surgical History:   Procedure Laterality Date    COLONOSCOPY      TONSILLECTOMY      UPPER GASTROINTESTINAL ENDOSCOPY       Family History   Problem Relation Age of Onset    Cancer Mother     Alcohol Abuse Father     Cancer Sister     Diabetes Maternal Grandfather      Social History     Tobacco Use    Smoking status: Never Smoker    Smokeless tobacco: Never Used   Substance Use Topics    Alcohol use: No      Current Outpatient Medications   Medication Sig Dispense Refill    lovastatin (MEVACOR) 20 MG tablet Take 1 tablet by mouth daily 90 tablet 1    esomeprazole (NEXIUM) 40 MG delayed release capsule Take 1 capsule by mouth every morning (before breakfast) 90 capsule 1    Naproxen Sodium (ALEVE) 220 MG CAPS Take by mouth      aspirin 81 MG tablet Take 81 mg by mouth daily       No current facility-administered medications for this visit. No Known Allergies  Health Maintenance   Topic Date Due    Hepatitis C screen  Never done    Annual Wellness Visit (AWV)  Never done    Lipid screen  11/20/2021    DTaP/Tdap/Td vaccine (2 - Td) 04/05/2028    Flu vaccine  Completed    Shingles Vaccine  Completed    Pneumococcal 65+ years Vaccine  Completed    COVID-19 Vaccine  Completed    Hepatitis A vaccine  Aged Out    Hepatitis B vaccine  Aged Out    Hib vaccine  Aged Out    Meningococcal (ACWY) vaccine  Aged Out       Objective:  /82 (Site: Left Upper Arm, Position: Sitting)   Pulse 80   Temp 97.8 °F (36.6 °C)   Resp 16   Ht 6' 2\" (1.88 m)   Wt 222 lb 3.2 oz (100.8 kg)   SpO2 98%   BMI 28.53 kg/m²   Physical Exam  Vitals reviewed. Constitutional:       General: He is not in acute distress. Appearance: He is not ill-appearing. HENT:      Nose: Nose normal. No mucosal edema. Right Nostril: No epistaxis or septal hematoma. Left Nostril: No epistaxis or septal hematoma. Cardiovascular:      Rate and Rhythm: Normal rate and regular rhythm. Heart sounds: No murmur heard. Pulmonary:      Effort: Pulmonary effort is normal. No respiratory distress. Breath sounds: No wheezing or rhonchi. Neurological:      Mental Status: He is alert. Mental status is at baseline.    Psychiatric:         Mood and Affect: Mood normal.         Behavior: Behavior normal.       Right

## 2021-08-11 ENCOUNTER — NURSE TRIAGE (OUTPATIENT)
Dept: OTHER | Facility: CLINIC | Age: 76
End: 2021-08-11

## 2021-08-11 NOTE — TELEPHONE ENCOUNTER
Reason for Disposition   Patient wants to be seen    Answer Assessment - Initial Assessment Questions  1. ONSET: \"When did the pain begin? \"       5-6 weeks    2. LOCATION: \"Where does it hurt? \" (upper, mid or lower back)      Upper right back and around to front    3. SEVERITY: \"How bad is the pain? \"  (e.g., Scale 1-10; mild, moderate, or severe)    - MILD (1-3): doesn't interfere with normal activities     - MODERATE (4-7): interferes with normal activities or awakens from sleep     - SEVERE (8-10): excruciating pain, unable to do any normal activities       5/10    4. PATTERN: \"Is the pain constant? \" (e.g., yes, no; constant, intermittent)       More constant    5. RADIATION: \"Does the pain shoot into your legs or elsewhere? \"      Starts right upper back around to front    6. CAUSE:  \"What do you think is causing the back pain? \"       Unsure    7. BACK OVERUSE:  Angel Honor recent lifting of heavy objects, strenuous work or exercise? \"      Denies    8. MEDICATIONS: \"What have you taken so far for the pain? \" (e.g., nothing, acetaminophen, NSAIDS)      Takes 2 aleve daily    9. NEUROLOGIC SYMPTOMS: \"Do you have any weakness, numbness, or problems with bowel/bladder control? \"      Denies    10. OTHER SYMPTOMS: \"Do you have any other symptoms? \" (e.g., fever, abdominal pain, burning with urination, blood in urine)        Denies    11. PREGNANCY: \"Is there any chance you are pregnant? \" (e.g., yes, no; LMP)        n/a    Protocols used: BACK PAIN-ADULT-[place]    Received call from [facility] with [employer]. Brief description of triage: as above right upper back pain that radiates to front on/off for 5-6 weeks no fever no injury     Triage indicates for patient to be seen in 3 days    Care advice provided, patient verbalizes understanding; denies any other questions or concerns; instructed to call back for any new or worsening symptoms.     Writer provided warm transfer to [facility] at [facility] for appointment scheduling. Attention Provider: Thank you for allowing me to participate in the care of your patient. The patient was connected to triage in response to information provided to the [facility]. Please do not respond through this encounter as the response is not directed to a shared pool.

## 2021-08-12 ENCOUNTER — TELEPHONE (OUTPATIENT)
Dept: FAMILY MEDICINE CLINIC | Age: 76
End: 2021-08-12

## 2021-08-12 NOTE — TELEPHONE ENCOUNTER
----- Message from Regis Samaniego sent at 8/11/2021 10:28 AM EDT -----  Subject: Appointment Request    Reason for Call: Semi-Routine Return from RN Triage    QUESTIONS  Type of Appointment? Established Patient  Reason for appointment request? No appointments available during search  Additional Information for Provider? Patient is having pain in upper right   back for past 6 weeks   ---------------------------------------------------------------------------  --------------  CALL BACK INFO  What is the best way for the office to contact you? OK to leave message on   voicemail  Preferred Call Back Phone Number? [phone]  ---------------------------------------------------------------------------  --------------  SCRIPT ANSWERS  Patient needs to be seen within 5 days? Yes  Nurse Name? Leticia  Have you been diagnosed with, awaiting test results for, or told that you   are suspected of having COVID-19 (Coronavirus)? (If patient has tested   negative or was tested as a requirement for work, school, or travel and   not based on symptoms, answer no)? No  Do you currently have flu-like symptoms including fever or chills, cough,   shortness of breath, difficulty breathing, or new loss of taste or smell? No  Have you had close contact with someone with COVID-19 in the last 14 days? No  (Service Expert  click yes below to proceed with [employer] As Usual   Scheduling)?  Yes

## 2021-08-12 NOTE — TELEPHONE ENCOUNTER
[place] calls to request an appointment for low back pain radiating to abdomen. Appointment made

## 2021-08-13 ENCOUNTER — OFFICE VISIT (OUTPATIENT)
Dept: FAMILY MEDICINE CLINIC | Age: 76
End: 2021-08-13
Payer: COMMERCIAL

## 2021-08-13 ENCOUNTER — HOSPITAL ENCOUNTER (OUTPATIENT)
Age: 76
Discharge: HOME | End: 2021-08-13
Payer: COMMERCIAL

## 2021-08-13 ENCOUNTER — HOSPITAL ENCOUNTER (OUTPATIENT)
Dept: GENERAL RADIOLOGY | Age: 76
Discharge: HOME | End: 2021-08-13
Payer: COMMERCIAL

## 2021-08-13 VITALS
TEMPERATURE: 97.5 F | WEIGHT: 229 LBS | HEIGHT: 74 IN | SYSTOLIC BLOOD PRESSURE: 140 MMHG | HEART RATE: 70 BPM | OXYGEN SATURATION: 98 % | DIASTOLIC BLOOD PRESSURE: 80 MMHG | BODY MASS INDEX: 29.39 KG/M2

## 2021-08-13 DIAGNOSIS — M54.50 LUMBAR BACK PAIN: ICD-10-CM

## 2021-08-13 DIAGNOSIS — M54.50 LUMBAR BACK PAIN: Primary | ICD-10-CM

## 2021-08-13 PROCEDURE — G8427 DOCREV CUR MEDS BY ELIG CLIN: HCPCS | Performed by: FAMILY MEDICINE

## 2021-08-13 PROCEDURE — 72100 X-RAY EXAM L-S SPINE 2/3 VWS: CPT

## 2021-08-13 PROCEDURE — G8417 CALC BMI ABV UP PARAM F/U: HCPCS | Performed by: FAMILY MEDICINE

## 2021-08-13 PROCEDURE — 1123F ACP DISCUSS/DSCN MKR DOCD: CPT | Performed by: FAMILY MEDICINE

## 2021-08-13 PROCEDURE — 99213 OFFICE O/P EST LOW 20 MIN: CPT | Performed by: FAMILY MEDICINE

## 2021-08-13 PROCEDURE — 4040F PNEUMOC VAC/ADMIN/RCVD: CPT | Performed by: FAMILY MEDICINE

## 2021-08-13 PROCEDURE — 1036F TOBACCO NON-USER: CPT | Performed by: FAMILY MEDICINE

## 2021-08-13 SDOH — ECONOMIC STABILITY: FOOD INSECURITY: WITHIN THE PAST 12 MONTHS, YOU WORRIED THAT YOUR FOOD WOULD RUN OUT BEFORE YOU GOT MONEY TO BUY MORE.: NEVER TRUE

## 2021-08-13 SDOH — ECONOMIC STABILITY: FOOD INSECURITY: WITHIN THE PAST 12 MONTHS, THE FOOD YOU BOUGHT JUST DIDN'T LAST AND YOU DIDN'T HAVE MONEY TO GET MORE.: NEVER TRUE

## 2021-08-13 ASSESSMENT — PATIENT HEALTH QUESTIONNAIRE - PHQ9
1. LITTLE INTEREST OR PLEASURE IN DOING THINGS: 0
SUM OF ALL RESPONSES TO PHQ QUESTIONS 1-9: 0
SUM OF ALL RESPONSES TO PHQ QUESTIONS 1-9: 0
SUM OF ALL RESPONSES TO PHQ9 QUESTIONS 1 & 2: 0
SUM OF ALL RESPONSES TO PHQ QUESTIONS 1-9: 0
2. FEELING DOWN, DEPRESSED OR HOPELESS: 0

## 2021-08-13 ASSESSMENT — SOCIAL DETERMINANTS OF HEALTH (SDOH): HOW HARD IS IT FOR YOU TO PAY FOR THE VERY BASICS LIKE FOOD, HOUSING, MEDICAL CARE, AND HEATING?: NOT HARD AT ALL

## 2021-08-13 NOTE — PROGRESS NOTES
Hodan Linda (:  1945) is a 67 y.o. male,Established patient, here for evaluation of the following chief complaint(s):  Back Pain (1-2 months )         ASSESSMENT/PLAN:  1. Lumbar back pain  -     XR LUMBAR SPINE (2-3 VIEWS); Future    Appears to be musculoskeletal but pain waking him up at sleep needs to be evaluated. Consider PT in addition to medication if benign xray. Return if symptoms worsen or fail to improve. Subjective   SUBJECTIVE/OBJECTIVE:  HPI     For 2 months has progressive pain in his lower back. Night time and sitting are the worse. Will wake up with pain. In fact, the pain wakes him up from sleep. This never happened before. He has been giving it time. Aleve taken for knee pain which helps the back some. No radiating pain in legs. Hand cramping off and on but doesn't seem releated. Sometimes warm at night but ongoing issues. No weight loss. Has good appetite. Wt Readings from Last 3 Encounters:   21 229 lb (103.9 kg)   21 222 lb 3.2 oz (100.8 kg)   20 231 lb 4.8 oz (104.9 kg)       Review of Systems    no fever/chills. No SOB/CP. Objective   Physical Exam    Gen: NAD, AAO x 3, coherent, pleasant    CTAB. RRR    Back: mildly tender right lateral lumbar region. No midline tenderness. SLRT bilaterally normal.  ROM of back overall decent for this gentleman. Hip ROM normal.      DTRs at knees normal. No leg weakness observed. No sensory losses. An electronic signature was used to authenticate this note.     --Jose F Jarvis MD

## 2021-08-13 NOTE — PATIENT INSTRUCTIONS
Will call with results    Patient Education        Low Back Pain: Exercises  Introduction  Here are some examples of exercises for you to try. The exercises may be suggested for a condition or for rehabilitation. Start each exercise slowly. Ease off the exercises if you start to have pain. You will be told when to start these exercises and which ones will work best for you. How to do the exercises  Press-up   1. Lie on your stomach, supporting your body with your forearms. 2. Press your elbows down into the floor to raise your upper back. As you do this, relax your stomach muscles and allow your back to arch without using your back muscles. As your press up, do not let your hips or pelvis come off the floor. 3. Hold for 15 to 30 seconds, then relax. 4. Repeat 2 to 4 times. Alternate arm and leg (bird dog) exercise   Do this exercise slowly. Try to keep your body straight at all times, and do not let one hip drop lower than the other. 1. Start on the floor, on your hands and knees. 2. Tighten your belly muscles. 3. Raise one leg off the floor, and hold it straight out behind you. Be careful not to let your hip drop down, because that will twist your trunk. 4. Hold for about 6 seconds, then lower your leg and switch to the other leg. 5. Repeat 8 to 12 times on each leg. 6. Over time, work up to holding for 10 to 30 seconds each time. 7. If you feel stable and secure with your leg raised, try raising the opposite arm straight out in front of you at the same time. Knee-to-chest exercise   1. Lie on your back with your knees bent and your feet flat on the floor. 2. Bring one knee to your chest, keeping the other foot flat on the floor (or keeping the other leg straight, whichever feels better on your lower back). 3. Keep your lower back pressed to the floor. Hold for at least 15 to 30 seconds. 4. Relax, and lower the knee to the starting position. 5. Repeat with the other leg.  Repeat 2 to 4 times with each leg. 6. To get more stretch, put your other leg flat on the floor while pulling your knee to your chest.    Curl-ups   1. Lie on the floor on your back with your knees bent at a 90-degree angle. Your feet should be flat on the floor, about 12 inches from your buttocks. 2. Cross your arms over your chest. If this bothers your neck, try putting your hands behind your neck (not your head), with your elbows spread apart. 3. Slowly tighten your belly muscles and raise your shoulder blades off the floor. 4. Keep your head in line with your body, and do not press your chin to your chest.  5. Hold this position for 1 or 2 seconds, then slowly lower yourself back down to the floor. 6. Repeat 8 to 12 times. Pelvic tilt exercise   1. Lie on your back with your knees bent. 2. \"Brace\" your stomach. This means to tighten your muscles by pulling in and imagining your belly button moving toward your spine. You should feel like your back is pressing to the floor and your hips and pelvis are rocking back. 3. Hold for about 6 seconds while you breathe smoothly. 4. Repeat 8 to 12 times. Heel dig bridging   1. Lie on your back with both knees bent and your ankles bent so that only your heels are digging into the floor. Your knees should be bent about 90 degrees. 2. Then push your heels into the floor, squeeze your buttocks, and lift your hips off the floor until your shoulders, hips, and knees are all in a straight line. 3. Hold for about 6 seconds as you continue to breathe normally, and then slowly lower your hips back down to the floor and rest for up to 10 seconds. 4. Do 8 to 12 repetitions. Hamstring stretch in doorway   1. Lie on your back in a doorway, with one leg through the open door. 2. Slide your leg up the wall to straighten your knee. You should feel a gentle stretch down the back of your leg. 3. Hold the stretch for at least 15 to 30 seconds.  Do not arch your back, point your toes, or bend either knee. Keep one heel touching the floor and the other heel touching the wall. 4. Repeat with your other leg. 5. Do 2 to 4 times for each leg. Hip flexor stretch   1. Kneel on the floor with one knee bent and one leg behind you. Place your forward knee over your foot. Keep your other knee touching the floor. 2. Slowly push your hips forward until you feel a stretch in the upper thigh of your rear leg. 3. Hold the stretch for at least 15 to 30 seconds. Repeat with your other leg. 4. Do 2 to 4 times on each side. Wall sit   1. Stand with your back 10 to 12 inches away from a wall. 2. Lean into the wall until your back is flat against it. 3. Slowly slide down until your knees are slightly bent, pressing your lower back into the wall. 4. Hold for about 6 seconds, then slide back up the wall. 5. Repeat 8 to 12 times. Follow-up care is a key part of your treatment and safety. Be sure to make and go to all appointments, and call your doctor if you are having problems. It's also a good idea to know your test results and keep a list of the medicines you take. Where can you learn more? Go to [email or web address] org and sign in to your [employer] account. Enter O202 in the [facility] box to learn more about \"Low Back Pain: Exercises. \"     If you do not have an account, please click on the \"Sign Up Now\" link. Current as of: November 16, 2020               Content Version: 12.9  © 7474-5506 Healthwise, Incorporated. Care instructions adapted under license by [facility] [place] ([facility]). If you have questions about a medical condition or this instruction, always ask your healthcare professional. [place] [address] any warranty or liability for your use of this information.

## 2021-08-16 ENCOUNTER — TELEPHONE (OUTPATIENT)
Dept: FAMILY MEDICINE CLINIC | Age: 76
End: 2021-08-16

## 2021-08-16 DIAGNOSIS — M47.816 FACET ARTHROPATHY, LUMBAR: ICD-10-CM

## 2021-08-16 DIAGNOSIS — M54.50 LUMBAR BACK PAIN: Primary | ICD-10-CM

## 2021-08-16 DIAGNOSIS — M51.36 DDD (DEGENERATIVE DISC DISEASE), LUMBAR: ICD-10-CM

## 2021-08-16 RX ORDER — PREDNISONE 10 MG/1
10 TABLET ORAL DAILY
Qty: 45 TABLET | Refills: 0 | Status: SHIPPED | OUTPATIENT
Start: 2021-08-16 | End: 2021-12-02

## 2021-08-16 NOTE — RESULT ENCOUNTER NOTE
Please let patient know that his back xray confirms moderate osteoarthritis (wear and tear) with disc space narrowing at lower lumbar regions. Due to degree of pain and length of this episode, I recommend some steroids and PT. He would need to pause nsaids, and once PT done, he is recommended to continue stretches. Good shoes and good mattress helps. If pain doesn't improve with therapy or if there is progression of his condition, we'll need to consider MRI.

## 2021-08-16 NOTE — TELEPHONE ENCOUNTER
Spoke with patient- results and directive given. Agreeable to plan. Will call if there are any questions or concerns.

## 2021-08-23 ENCOUNTER — HOSPITAL ENCOUNTER (OUTPATIENT)
Dept: PHYSICAL THERAPY | Age: 76
Setting detail: THERAPIES SERIES
Discharge: HOME | End: 2021-08-23
Payer: COMMERCIAL

## 2021-08-23 PROCEDURE — 97110 THERAPEUTIC EXERCISES: CPT

## 2021-08-23 PROCEDURE — 97161 PT EVAL LOW COMPLEX 20 MIN: CPT

## 2021-08-23 NOTE — PROGRESS NOTES
** PLEASE SIGN, DATE AND TIME CERTIFICATION BELOW AND RETURN TO [facility] OUTPATIENT REHABILITATION (FAX #: [phone]). ATTEST/CO-SIGN IF ACCESSING VIA [employer]. THANK YOU.**    I certify that I have examined the patient below and determined that Physical Medicine and Rehabilitation service is necessary and that I approve the established plan of care for up to 90 days or as specifically noted. Attestation, signature or co-signature of physician indicates approval of certification requirements.    ________________________ ____________ __________  Physician Signature   Date   Time   [address] [facility]  PHYSICAL THERAPY  [x] EVALUATION  [] DAILY NOTE (LAND) [] DAILY NOTE (AQUATIC ) [] PROGRESS NOTE [] DISCHARGE NOTE    [] [facility]   [x] [facility]    [] [facility]   [] Maricruz Goldstein    Date: 2021  Patient Name:  Quique Okeefe  : 1945  MRN: [id number]  CSN: [id number]    Referring Practitioner Keshia Wilson MD   Diagnosis Low back pain [M54.5]    Treatment Diagnosis LBP, difficulty walking   Date of Evaluation 21   Additional Pertinent History High cholesterol. L knee OA      Functional Outcome Measure Used [facility] back disability   Functional Outcome Score eval score 73(21)       Insurance: Primary: Payor: Nica Pemberton /  /  / ,   Secondary: [employer]   Authorization Information: Pays at 80%, modalities covered except ionto/MHP/CP not covered   Visit # 1, 1/10 for progress note   Visits Allowed: Unlimited visits   Recertification Date:    Physician Follow-Up: 2021   Physician Orders:    History of Present Illness:      SUBJECTIVE: patient reports May 2021 starting with LB and thoracic/rib pain with insidious onset. Had been doing remodeling work for daughter, was doing new cabinets, aneta in May until last week, but no pain during work, more pain in evening.    Pain continued and back to family MD office, started prednisone dose pack and x-ray, PT ordered. Frequent golfer, plays 5 days per week, walking 9 holes or riding cart 18 holes. Does have fatigue with golf but no LBP, does have L knee pain intermittently in history with OA. Increased LBP bending over to put socks and shoes on. Social/Functional History and Current Status:  Medications and Allergies have been reviewed and are listed on Medical History Questionnaire. Haydee Ann lives with spouse in a single story home with stairs and a handrail to enter. increased pain carrying salt to basement water softener. Has elliptical machine and TM in basement but not using x 1 year \"I just kind of got away from doing it. \"    Task Previous Current   ADLs  Independent Modified Independent increased pain bending over to put socks and shoes on   IADL's Independent Modified Independent increased pain carrying water softener salt down to basement   Ambulation Independent Modified Independent increased pain back after walking 9 holes of golf pain 3/10   Transfers Independent Modified Independent   Recreation Independent Modified Independent- enjoys golfing in summer 5 x per week, does enjoy woodworking, cooking, cleaning car, enjoys traveling.   Gardening and bending over increased LBP   [address] awakening 3 times per night due to back pain   Driving Active  Active    Work Retired  Retired       OBJECTIVE:  Pain LBP/lower thoracic/B lateral trunk/rib pain 100% of the day, high 5/10, average 3/10   Palpation Mid muscle spasms noted upper lumbar, lower thoracic paraspinal    Observation Note moderate genu varus L knee   Posture fair        Range of Motion Back: lumbar flexion 100%, extension limited by 75%, lateral flexion R 50% limited with pain, L 25 % limited  Hip: R hip flexion 60 with LBP, L hip flexion 55 with LBP  Knee: R knee 0- 135 degrees with pain with flexion, L knee 0- 125 with L knee pain   Strength Right Lower Extremity:  Impaired - R hip flexion 4-/5 with LBP, knee 4/5 with no pain  Left Lower Extremity:   Impaired - L hip flexion 3+/5, knee 4-/5   poor abdominal strength   Coordination WFL   Sensation WFL   Bed Mobility [facility]   Transfers [facility]   Ambulation Modified Independent  Distance: 100 feet  Surface: Level Tile  Device:No Device  Gait Deviations:   Forward Flexed Posture, Decreased Trunk Rotation and Wide Base of Support   Balance Tinetti: 20/28   Special Tests Repeated lumbar flexion standing and supine increased pain, negative Lee tests and DAYSI           TREATMENT   Precautions: None currently   Pain: LBP/thoracic/rib pain 3/10    X in shaded column indicates activity completed today   Modalities Parameters/  Location  Notes                     Manual Therapy Time/Technique  Notes                     Exercise/Intervention   Notes   Abdominal bracing knees bent 10 5 x    Abdominal bracing with quad set R/L 5 5 x    Abdominal bracing with LAQ 1/2 way in chair, alternating legs 5 5 x    Education on posture, maintaining lumbar lordosis, pillow/lumbar roll behind back   x     education to avoid flexion and twisting spine, bend at knees, move feet   x                                                Specific Interventions Next Treatment: neutral DLSP supine/seated, avoid L knee pain with exercises, progress to prone,prop avoiding increased pain, modalities to include estim as needed for pain control    Activity/Treatment Tolerance:  [x]  Patient tolerated treatment well  []  Patient limited by fatigue  []  Patient limited by pain   []  Patient limited by medical complications  []  Other:     Assessment: PT needed for exercises to improve AROM, strength, and modalities for pain control as needed  Body Structures/Functions/Activity Limitations: impaired ROM, impaired strength, pain and abnormal gait  Prognosis: good    GOALS:  Patient Goal: to get my pain to go away    Short Term Goals:  Time Frame: deferred to LTG's      Long Term Goals:  Time Frame: 5 weeks  1. Increase AROM lumbar extension to 50%, lateral flexion R to 75%, B hip flexion to 70, R knee flexion to 130 degrees to allow patient to report able to golf 9 holes with decreased pain 1/10  2. Increase strength abdominal strength to fair, B LE strength to 4/5 to allow patient to do lawn /garden work with decreased pain to 1/10  3. I with HEP as prescribed to allow patient to sleep through night without awakening due to back pain    Patient Education:   [x]  HEP/Education Completed: Plan of Care, Goals, handout of above HEP     []  No new Education completed  []  Reviewed Prior HEP      []  Patient verbalized and/or demonstrated understanding of education provided. []  Patient unable to verbalize and/or demonstrate understanding of education provided. Will continue education. [x]  Barriers to learning: none    PLAN:  Treatment Recommendations: Strengthening, Range of Motion, Pain Management, Home Exercise Program and Modalities    [x]  Plan of care initiated. Plan to see patient 2 times per week for 5 weeks to address the treatment planned outlined above.   []  Continue with current plan of care  []  Modify plan of care as follows:    []  Hold pending physician visit  []  Discharge    Time In 1530   Time Out 1615   Timed Code Minutes: 25 min   Total Treatment Time: 50 min       Electronically Signed by: Melinda Lawson PT

## 2021-09-01 ENCOUNTER — HOSPITAL ENCOUNTER (OUTPATIENT)
Dept: PHYSICAL THERAPY | Age: 76
Setting detail: THERAPIES SERIES
Discharge: HOME | End: 2021-09-01
Payer: COMMERCIAL

## 2021-09-01 PROCEDURE — 97110 THERAPEUTIC EXERCISES: CPT

## 2021-09-01 NOTE — PROGRESS NOTES
[address] [facility]  PHYSICAL THERAPY  [] EVALUATION  [x] DAILY NOTE (LAND) [] DAILY NOTE (AQUATIC ) [] PROGRESS NOTE [] DISCHARGE NOTE    [] [address] [facility]   [x] [place] 90    [] [facility]   [] Lazaro Singh    Date: 2021  Patient Name:  Shira Collazo  : 1945  MRN: [id number]  CSN: [id number]    Referring Practitioner Navdeep Bangura MD   Diagnosis Low back pain [M54.5]    Treatment Diagnosis LBP, difficulty walking   Date of Evaluation 21   Additional Pertinent History High cholesterol. L knee OA      Functional Outcome Measure Used [facility] back disability   Functional Outcome Score eval score 73(21)       Insurance: Primary: Payor: Nancy Lyons /  /  / ,   Secondary: [employer] M[employer]UAL   Authorization Information: Galen Oates at 80%, modalities covered except ionto/MHP/CP not covered   Visit # 2, 2/10 for progress note   Visits Allowed: Unlimited visits   Recertification Date:    Physician Follow-Up: 2021   Physician Orders:    History of Present Illness:      SUBJECTIVE: patient reports not feeling well today, feeling stiff, pain in joints and muscles. Pt reports driving to [place] last Thursday, played golf Friday/saturday/, drove back to [facility] Monday. Pt reports no issues with back while driving or playing golf. Has played golf yesterday and today.     OBJECTIVE:  TREATMENT   Precautions: None currently   Pain: LBP/thoracic/rib pain 0-2/10    X in shaded column indicates activity completed today   Modalities Parameters/  Location  Notes                     Manual Therapy Time/Technique  Notes                     Exercise/Intervention   Notes   Abdominal bracing knees bent 10 5 x    Abdominal bracing with quad set R/L 10 5 x    Abdominal bracing with SLR flex and extension  x5  x Ea, B   Prone prop  1 min x    Abdominal bracing with bridges x10 5 x    Abdominal bracing with SL clamshells 10  x Abdominal bracing with LAQ 1/2 way in chair, alternating legs 5 5 x    Abdominal bracing with marching, seated x10  x    Education on posture, maintaining lumbar lordosis, pillow/lumbar roll behind back        education to avoid flexion and twisting spine, bend at knees, move feet              Standing hip 3 way on foam                                     Specific Interventions Next Treatment: neutral DLSP supine/seated, avoid L knee pain with exercises, progress to prone,prop avoiding increased pain, modalities to include estim as needed for pain control    Activity/Treatment Tolerance:  [x]  Patient tolerated treatment well  []  Patient limited by fatigue  []  Patient limited by pain   []  Patient limited by medical complications  []  Other:     Assessment: Pt tolerated session well. No pain during or after session. Pt to monitor symptoms after session. GOALS:  Patient Goal: to get my pain to go away    Short Term Goals:  Time Frame: deferred to LTG's      Long Term Goals:  Time Frame: 5 weeks  1. Increase AROM lumbar extension to 50%, lateral flexion R to 75%, B hip flexion to 70, R knee flexion to 130 degrees to allow patient to report able to golf 9 holes with decreased pain 1/10  2. Increase strength abdominal strength to fair, B LE strength to 4/5 to allow patient to do lawn /garden work with decreased pain to 1/10  3. I with HEP as prescribed to allow patient to sleep through night without awakening due to back pain    Patient Education:   [x]  HEP/Education Completed: Plan of Care, Goals, handout of above HEP     []  No new Education completed  [x]  Reviewed Prior HEP      []  Patient verbalized and/or demonstrated understanding of education provided. []  Patient unable to verbalize and/or demonstrate understanding of education provided. Will continue education.   [x]  Barriers to learning: none    PLAN:  Treatment Recommendations: Strengthening, Range of Motion, Pain Management, Home Exercise Program and Modalities    []  Plan of care initiated. Plan to see patient 2 times per week for 5 weeks to address the treatment planned outlined above.   [x]  Continue with current plan of care  []  Modify plan of care as follows:    []  Hold pending physician visit  []  Discharge    Time In 1355   Time Out 1420   Timed Code Minutes: 25 min   Total Treatment Time: 25 min       Electronically Signed by: Wendy Bello, PTA [id number]

## 2021-09-03 ENCOUNTER — HOSPITAL ENCOUNTER (OUTPATIENT)
Dept: PHYSICAL THERAPY | Age: 76
Setting detail: THERAPIES SERIES
Discharge: HOME | End: 2021-09-03
Payer: COMMERCIAL

## 2021-09-03 PROCEDURE — 97110 THERAPEUTIC EXERCISES: CPT

## 2021-09-03 NOTE — PROGRESS NOTES
[address] [facility]  PHYSICAL THERAPY  [] EVALUATION  [x] DAILY NOTE (LAND) [] DAILY NOTE (AQUATIC ) [] PROGRESS NOTE [] DISCHARGE NOTE    [] [address] [facility]   [x] [place] 90    [] [facility]   [] Mohsen Siddiqi    Date: 9/3/2021  Patient Name:  Fredo Woodward  : 1945  MRN: [id number]  CSN: [id number]    Referring Practitioner Trinh Whalen MD   Diagnosis Low back pain [M54.5]    Treatment Diagnosis LBP, difficulty walking   Date of Evaluation 21   Additional Pertinent History High cholesterol. L knee OA      Functional Outcome Measure Used [facility] back disability   Functional Outcome Score eval score 73(21)       Insurance: Primary: Payor: Jono Billy /  /  / ,   Secondary: MEDICAL MUTUAL   Authorization Information: Mode Zavala at 80%, modalities covered except ionto/MHP/CP not covered   Visit # 3, 3/10 for progress note   Visits Allowed: Unlimited visits   Recertification Date:    Physician Follow-Up: 2021   Physician Orders:    History of Present Illness:      SUBJECTIVE: patient denies having any pain today. Patient reporting playing golf today.     OBJECTIVE:  TREATMENT   Precautions: None currently   Pain: denies    X in shaded column indicates activity completed today   Modalities Parameters/  Location  Notes                     Manual Therapy Time/Technique  Notes                     Exercise/Intervention   Notes   Abdominal bracing knees bent 15 5 x    Abdominal bracing with quad set R/L 10 5 x    Abdominal bracing with SLR flex and extension  10  x Ea, B   Prone prop  1 min x    Abdominal bracing with bridges x10 5 x    Abdominal bracing with SL clamshells, hip abduction 10  x    Abdominal bracing with marching, shoulder flexion and combo UE/LE  10  x    Abdominal bracing with LAQ 1/2 way in chair, alternating legs 10 5 x    Abdominal bracing with marching, seated x10  x    Education on posture, maintaining lumbar lordosis, pillow/lumbar roll behind back        education to avoid flexion and twisting spine, bend at knees, move feet              Standing hip 3 way on foam                                     Specific Interventions Next Treatment: neutral DLSP supine/seated, avoid L knee pain with exercises, progress to prone,prop avoiding increased pain, modalities to include estim as needed for pain control    Activity/Treatment Tolerance:  [x]  Patient tolerated treatment well  []  Patient limited by fatigue  []  Patient limited by pain   []  Patient limited by medical complications  []  Other:     Assessment: Progressed with reps as noted above with patient tolerating well. Did have a little soreness with reps added to SLR but no other complains. No pain at end of session. GOALS:  Patient Goal: to get my pain to go away    Short Term Goals:  Time Frame: deferred to LT's      Long Term Goals:  Time Frame: 5 weeks  1. Increase AROM lumbar extension to 50%, lateral flexion R to 75%, B hip flexion to 70, R knee flexion to 130 degrees to allow patient to report able to golf 9 holes with decreased pain 1/10  2. Increase strength abdominal strength to fair, B LE strength to 4/5 to allow patient to do lawn /garden work with decreased pain to 1/10  3. I with HEP as prescribed to allow patient to sleep through night without awakening due to back pain    Patient Education:   [x]  HEP/Education Completed: Plan of Care, Goals, handout of above HEP     []  No new Education completed  [x]  Reviewed Prior HEP      []  Patient verbalized and/or demonstrated understanding of education provided. []  Patient unable to verbalize and/or demonstrate understanding of education provided. Will continue education.   [x]  Barriers to learning: none    PLAN:  Treatment Recommendations: Strengthening, Range of Motion, Pain Management, Home Exercise Program and Modalities      Plan to see patient 2 times per week for 5 weeks to address the treatment planned outlined above.   [x]  Continue with current plan of care  []  Modify plan of care as follows:    []  Hold pending physician visit  []  Discharge    Time In 1528   Time Out 1553   Timed Code Minutes: 25 min   Total Treatment Time: 25 min       Electronically Signed by: Jed West PTA

## 2021-09-08 ENCOUNTER — HOSPITAL ENCOUNTER (OUTPATIENT)
Dept: PHYSICAL THERAPY | Age: 76
Setting detail: THERAPIES SERIES
Discharge: HOME | End: 2021-09-08
Payer: COMMERCIAL

## 2021-09-08 PROCEDURE — 97110 THERAPEUTIC EXERCISES: CPT

## 2021-09-08 NOTE — PROGRESS NOTES
[address] [facility]  PHYSICAL THERAPY  [] EVALUATION  [x] DAILY NOTE (LAND) [] DAILY NOTE (AQUATIC ) [] PROGRESS NOTE [] DISCHARGE NOTE    [] [facility]   [x] [facility]     [] [facility]   []     Date: 2021  Patient Name:  Giles Cobb  : 1945  MRN: [id number]  CSN: [id number]    Referring Practitioner Danisha Vasques MD   Diagnosis Low back pain [M54.5]    Treatment Diagnosis LBP, difficulty walking   Date of Evaluation 21   Additional Pertinent History High cholesterol. L knee OA      Functional Outcome Measure Used [facility] back disability   Functional Outcome Score eval score 73(21)       Insurance: Primary: Payor: Juma Mckeon /  /  / ,   Secondary: [employer]   Authorization Information: Richar Sas at 80%, modalities covered except ionto/MHP/CP not covered   Visit # 4, 4/10 for progress note   Visits Allowed: Unlimited visits   Recertification Date:    Physician Follow-Up: 2021   Physician Orders:    History of Present Illness:      SUBJECTIVE: reports pain at night has gone away. Walked 9 holes of golf today.     OBJECTIVE:  TREATMENT   Precautions: None currently   Pain: denies    X in shaded column indicates activity completed today   Modalities Parameters/  Location  Notes                     Manual Therapy Time/Technique  Notes                     Exercise/Intervention   Notes   Abdominal bracing knees bent 15 5 x    Abdominal bracing with quad set R/L 15 5 x    Abdominal bracing with SLR flex and extension  10 5 x Ea, B   Prone prop  Prone press up 5 x pressup 2 min x    Abdominal bracing with bridges x10 5 x    Abdominal bracing with SL clamshells, hip abduction 10  x    Abdominal bracing with marching, shoulder flexion and combo UE/LE  10  x    Abdominal bracing with LAQ 1/2 way in chair, alternating legs 15 5 x    Abdominal bracing with marching, seated x10  x    LTR  5 5 x     education to avoid flexion and twisting spine, bend at knees, move feet       Nustep, seat 2 holes showing, UE 10, level 1 5 minutes  x                                         Specific Interventions Next Treatment: neutral DLSP supine/seated, avoid L knee pain with exercises, progress to prone,prop avoiding increased pain, modalities to include estim as needed for pain control    Activity/Treatment Tolerance:  [x]  Patient tolerated treatment well  []  Patient limited by fatigue  []  Patient limited by pain   []  Patient limited by medical complications  []  Other:     Assessment: PT did educate on needing to stay active during winter months when not golfing, walking 2-3 x per weeks. LTR and pressup added to HEP with handout given    GOALS:  Patient Goal: to get my pain to go away    Short Term Goals:  Time Frame: deferred to LTG's      Long Term Goals:  Time Frame: 5 weeks  1. Increase AROM lumbar extension to 50%, lateral flexion R to 75%, B hip flexion to 70, R knee flexion to 130 degrees to allow patient to report able to golf 9 holes with decreased pain 1/10  2. Increase strength abdominal strength to fair, B LE strength to 4/5 to allow patient to do lawn /garden work with decreased pain to 1/10  3. I with HEP as prescribed to allow patient to sleep through night without awakening due to back pain    Patient Education:   [x]  HEP/Education Completed: Plan of Care, Goals, handout of above HEP     []  No new Education completed  [x]  Reviewed Prior HEP      []  Patient verbalized and/or demonstrated understanding of education provided. []  Patient unable to verbalize and/or demonstrate understanding of education provided. Will continue education.   [x]  Barriers to learning: none    PLAN:  Treatment Recommendations: Strengthening, Range of Motion, Pain Management, Home Exercise Program and Modalities      Plan to see patient 2 times per week for 5 weeks to address the treatment planned outlined above.   [x]  Continue with current plan of care  []  Modify plan of care as follows:    []  Hold pending physician visit  []  Discharge    Time In 1400   Time Out 1430   Timed Code Minutes: 30 min   Total Treatment Time: 30 min       Electronically Signed by: Sana Farrell PT

## 2021-09-10 ENCOUNTER — APPOINTMENT (OUTPATIENT)
Dept: PHYSICAL THERAPY | Age: 76
End: 2021-09-10
Payer: COMMERCIAL

## 2021-09-15 ENCOUNTER — HOSPITAL ENCOUNTER (OUTPATIENT)
Dept: PHYSICAL THERAPY | Age: 76
Setting detail: THERAPIES SERIES
Discharge: HOME | End: 2021-09-15
Payer: COMMERCIAL

## 2021-09-15 PROCEDURE — 97110 THERAPEUTIC EXERCISES: CPT

## 2021-09-15 NOTE — PROGRESS NOTES
[address] [facility]  PHYSICAL THERAPY  [] EVALUATION  [x] DAILY NOTE (LAND) [] DAILY NOTE (AQUATIC ) [] PROGRESS NOTE [] DISCHARGE NOTE    [] [address] [place] [address]   [x] [facility]     [] [facility] [place]   [] Fady Angeles    Date: 9/15/2021  Patient Name:  Perfecto Esquivel  : 1945  MRN: [id number]  CSN: [id number]    Referring Practitioner Denisse Flores MD   Diagnosis Low back pain [M54.5]    Treatment Diagnosis LBP, difficulty walking   Date of Evaluation 21   Additional Pertinent History High cholesterol. L knee OA      Functional Outcome Measure Used [facility] back disability   Functional Outcome Score eval score 73(21)       Insurance: Primary: Payor: Tito Lewis /  /  / ,   Secondary: [employer] M[employer]UAL   Authorization Information: Ramirez Barr at 80%, modalities covered except ionto/MHP/CP not covered   Visit # 5, 5/10 for progress note   Visits Allowed: Unlimited visits   Recertification Date:    Physician Follow-Up: 2021   Physician Orders:    History of Present Illness:      SUBJECTIVE: reports intermittent pain in LB 2/10 high.   Walked 9 holes of golf yesterday with no pain    OBJECTIVE:  TREATMENT   Precautions: None currently   Pain: 2/10    X in shaded column indicates activity completed today   Modalities Parameters/  Location  Notes                     Manual Therapy Time/Technique  Notes                     Exercise/Intervention   Notes   Abdominal bracing knees bent 15 5 x    Abdominal bracing with quad set R/L 15 5 x    Abdominal bracing with SLR flex and extension  10 5 x Ea, B   Prone prop  Prone press up 5 x pressup 10 x 2 min x    Abdominal bracing with bridges x10 5 x    Abdominal bracing with SL clamshells, hip abduction 10  x    Abdominal bracing with marching, shoulder flexion and combo UE/LE  10  x    Abdominal bracing with LAQ 1/2 way in chair, alternating legs 15 5 x    Abdominal bracing with marching, seated x10  x    LTR  5 5 x     education to avoid flexion and twisting spine, bend at knees, move feet       Nustep, seat 2 holes showing, UE 10, level 2 5 minutes  x                                         Specific Interventions Next Treatment: neutral DLSP supine/seated, avoid L knee pain with exercises, progress to prone,prop avoiding increased pain, modalities to include estim as needed for pain control    Activity/Treatment Tolerance:  [x]  Patient tolerated treatment well  []  Patient limited by fatigue  []  Patient limited by pain   []  Patient limited by medical complications  []  Other:     Assessment: pain abolished with doing exercises during session, advised if having pain to try HEP to get to lessen, added prone press up and seated hamstring stretch to HEP  GOALS:  Patient Goal: to get my pain to go away    Short Term Goals:  Time Frame: deferred to LTG's      Long Term Goals:  Time Frame: 5 weeks  1. Increase AROM lumbar extension to 50%, lateral flexion R to 75%, B hip flexion to 70, R knee flexion to 130 degrees to allow patient to report able to golf 9 holes with decreased pain 1/10  2. Increase strength abdominal strength to fair, B LE strength to 4/5 to allow patient to do lawn /garden work with decreased pain to 1/10  3. I with HEP as prescribed to allow patient to sleep through night without awakening due to back pain    Patient Education:   [x]  HEP/Education Completed: Plan of Care, Goals, handout of above HEP     []  No new Education completed  [x]  Reviewed Prior HEP      []  Patient verbalized and/or demonstrated understanding of education provided. []  Patient unable to verbalize and/or demonstrate understanding of education provided. Will continue education.   [x]  Barriers to learning: none    PLAN:  Treatment Recommendations: Strengthening, Range of Motion, Pain Management, Home Exercise Program and Modalities      Plan to see patient 2 times per week for 5 weeks to address the treatment planned outlined above.   [x]  Continue with current plan of care  []  Modify plan of care as follows:    []  Hold pending physician visit  []  Discharge    Time In 1400   Time Out 1430   Timed Code Minutes: 30 min   Total Treatment Time: 30 min       Electronically Signed by: Antony Pizarro PT

## 2021-09-17 ENCOUNTER — APPOINTMENT (OUTPATIENT)
Dept: PHYSICAL THERAPY | Age: 76
End: 2021-09-17
Payer: COMMERCIAL

## 2021-09-22 ENCOUNTER — APPOINTMENT (OUTPATIENT)
Dept: PHYSICAL THERAPY | Age: 76
End: 2021-09-22
Payer: COMMERCIAL

## 2021-09-24 ENCOUNTER — HOSPITAL ENCOUNTER (OUTPATIENT)
Dept: PHYSICAL THERAPY | Age: 76
Setting detail: THERAPIES SERIES
End: 2021-09-24
Payer: COMMERCIAL

## 2021-09-30 ENCOUNTER — HOSPITAL ENCOUNTER (OUTPATIENT)
Dept: PHYSICAL THERAPY | Age: 76
Setting detail: THERAPIES SERIES
Discharge: HOME | End: 2021-09-30
Payer: COMMERCIAL

## 2021-09-30 PROCEDURE — 97110 THERAPEUTIC EXERCISES: CPT

## 2021-09-30 NOTE — PROGRESS NOTES
[address] [facility]  PHYSICAL THERAPY  [] EVALUATION  [x] DAILY NOTE (LAND) [] DAILY NOTE (AQUATIC ) [] PROGRESS NOTE [] DISCHARGE NOTE    [] [address] [place] [address]   [x] [facility]    [] [facility]   [] Bony Shearing    Date: 2021  Patient Name:  Cher Flowers  : 1945  MRN: [id number]  CSN: [id number]    Referring Practitioner Emilia Ramirez MD   Diagnosis Low back pain [M54.5]    Treatment Diagnosis LBP, difficulty walking   Date of Evaluation 21   Additional Pertinent History High cholesterol. L knee OA      Functional Outcome Measure Used [facility] back disability   Functional Outcome Score eval score 73(21)       Insurance: Primary: Payor: Jaylen Oh /  /  / ,   Secondary: MEDICAL M[employer]UAL   Authorization Information: Pays at 80%, modalities covered except ionto/MHP/CP not covered   Visit # 6, 6/10 for progress note   Visits Allowed: Unlimited visits   Recertification Date:    Physician Follow-Up: 2021   Physician Orders:    History of Present Illness:      SUBJECTIVE: patient reports that he has been traveling from [place] x past couple of weeks. Lifting heavy picnic table yesterday with LBP 2/10.   Played golf this morning with no pain  OBJECTIVE:  TREATMENT   Precautions: None currently   Pain: 2/10    X in shaded column indicates activity completed today   Modalities Parameters/  Location  Notes                     Manual Therapy Time/Technique  Notes                     Exercise/Intervention   Notes   Abdominal bracing knees bent 15 5     Abdominal bracing with quad set R/L 15 5     Abdominal bracing with SLR flex and extension  10 5 x Ea, B   Prone prop  Prone press up 5 x pressup 5 x 2 min x    Abdominal bracing with bridges x10 5     Abdominal bracing with SL clamshells, hip abduction 10      Abdominal bracing with marching, shoulder flexion and combo UE/LE  10      Abdominal bracing with LAQ 1/2 way in chair, alternating legs 15 5     Abdominal bracing with marching, seated x10  x    LTR  5 5 x Cues to not go so far as patient twisting trunk and pain side of trunk    education to avoid flexion and twisting spine, bend at knees, move feet       Nustep, seat 2 holes showing, UE 10, level 3 5 minutes  x                                         Specific Interventions Next Treatment: neutral DLSP supine/seated, avoid L knee pain with exercises, progress to prone,prop avoiding increased pain, modalities to include estim as needed for pain control    Activity/Treatment Tolerance:  [x]  Patient tolerated treatment well  []  Patient limited by fatigue  []  Patient limited by pain   []  Patient limited by medical complications  []  Other:     Assessment: patient would like to schedule 2 visits missed when traveling, LB is improving, increased resistance with NuStep  GOALS:  Patient Goal: to get my pain to go away    Short Term Goals:  Time Frame: deferred to LTG's      Long Term Goals:  Time Frame: 5 weeks  1. Increase AROM lumbar extension to 50%, lateral flexion R to 75%, B hip flexion to 70, R knee flexion to 130 degrees to allow patient to report able to golf 9 holes with decreased pain 1/10  2. Increase strength abdominal strength to fair, B LE strength to 4/5 to allow patient to do lawn /garden work with decreased pain to 1/10  3. I with HEP as prescribed to allow patient to sleep through night without awakening due to back pain    Patient Education:   [x]  HEP/Education Completed: Plan of Care, Goals, handout with prone press up     []  No new Education completed  [x]  Reviewed Prior HEP      []  Patient verbalized and/or demonstrated understanding of education provided. []  Patient unable to verbalize and/or demonstrate understanding of education provided. Will continue education.   [x]  Barriers to learning: none    PLAN:  Treatment Recommendations: Strengthening, Range of Motion, Pain Management, Home Exercise Program and Modalities      Plan to see patient 2 times per week for 5 weeks to address the treatment planned outlined above.   [x]  Continue with current plan of care  []  Modify plan of care as follows:    []  Hold pending physician visit  []  Discharge    Time In 1330   Time Out 1400   Timed Code Minutes: 30 min   Total Treatment Time: 30 min       Electronically Signed by: Quoc Peguero PT

## 2021-10-06 ENCOUNTER — HOSPITAL ENCOUNTER (OUTPATIENT)
Dept: PHYSICAL THERAPY | Age: 76
Setting detail: THERAPIES SERIES
Discharge: HOME | End: 2021-10-06
Payer: COMMERCIAL

## 2021-10-06 PROCEDURE — 97110 THERAPEUTIC EXERCISES: CPT

## 2021-10-06 NOTE — PROGRESS NOTES
LTR  5 5 x Cues to not go so far as patient twisting trunk and pain side of trunk    education to avoid flexion and twisting spine, bend at knees, move feet       Nustep, seat 2 holes showing, UE 10, level 5 5 minutes  x    Step-ups 2 inch, fwd 10 B x                                  Specific Interventions Next Treatment: neutral DLSP supine/seated, avoid L knee pain with exercises, progress to prone,prop avoiding increased pain, modalities to include estim as needed for pain control    Activity/Treatment Tolerance:  [x]  Patient tolerated treatment well  []  Patient limited by fatigue  []  Patient limited by pain   []  Patient limited by medical complications  []  Other:     Assessment: Pt tolerated session well, added step-ups to POC d/t pt stating he feels weak/unsure with them. Also added prone LE extension and UE flexion today with mod mm fatigue noted however no pain. GOALS:  Patient Goal: to get my pain to go away    Short Term Goals:  Time Frame: deferred to LTG's      Long Term Goals:  Time Frame: 5 weeks  1. Increase AROM lumbar extension to 50%, lateral flexion R to 75%, B hip flexion to 70, R knee flexion to 130 degrees to allow patient to report able to golf 9 holes with decreased pain 1/10  2. Increase strength abdominal strength to fair, B LE strength to 4/5 to allow patient to do lawn /garden work with decreased pain to 1/10  3. I with HEP as prescribed to allow patient to sleep through night without awakening due to back pain    Patient Education:   [x]  HEP/Education Completed: Plan of Care, Goals, handout with prone press up     []  No new Education completed  [x]  Reviewed Prior HEP      []  Patient verbalized and/or demonstrated understanding of education provided. []  Patient unable to verbalize and/or demonstrate understanding of education provided. Will continue education.   [x]  Barriers to learning: none    PLAN:  Treatment Recommendations: Strengthening, Range of Motion, Pain Management, Home Exercise Program and Modalities      Plan to see patient 2 times per week for 5 weeks to address the treatment planned outlined above.   [x]  Continue with current plan of care  []  Modify plan of care as follows:    []  Hold pending physician visit  []  Discharge    Time In 1205   Time Out 1230   Timed Code Minutes: 25 min   Total Treatment Time: 25 min       Electronically Signed by: Geovany Magana, PTA [id number]

## 2021-10-08 ENCOUNTER — HOSPITAL ENCOUNTER (OUTPATIENT)
Dept: PHYSICAL THERAPY | Age: 76
Setting detail: THERAPIES SERIES
Discharge: HOME | End: 2021-10-08
Payer: COMMERCIAL

## 2021-10-08 PROCEDURE — 97110 THERAPEUTIC EXERCISES: CPT

## 2021-10-08 ASSESSMENT — PAIN SCALES - QUEBEC BACK PAIN DISABILITY SCALE
CARRY TWO BAGS OF GROCERIES: 0
SLEEP THROUGH THE NIGHT: 0
BEND OVER TO CLEAN THE BATHTUB: 0
TAKE FOOD OUT OF THE REFRIGERATOR: 0
RIDE IN A CAR: 0
MAKE YOUR BED: 0
PULL OR PUSH HEAVY DOORS: 0
PUT ON SOCKS OR PANYHOSE: 0
MOVE A CHAIR: 0
WALK SEVERAL KILOMETERS  OR MILES: 5
GET OUT OF BED: 0
STAND UP FOR 20 TO 30 MINUTES: 0
WALK A FEW BLOCKS OR 300 TO 400M: 0
TURN OVER IN BED: 0
CLIMB ONE FLIGHT OF STAIRS: 0
SIT IN A CHAIR FOR SEVERAL HOURS: 0
TOTAL SCORE: 5
REACH UP TO HIGH SHELVES: 0
LIFT AND CARRY A HEAVY SUITCASE: 0
QUEBEC DISABILITY INDEX: 1-19%
QUEBEC CMS MODIFIER: CI
RUN ONE BLOCK OR 100M: 0
THROW A BALL: 0

## 2021-10-08 NOTE — DISCHARGE SUMMARY
[address] [facility]  PHYSICAL THERAPY  [] EVALUATION  [] DAILY NOTE (LAND) [] DAILY NOTE (AQUATIC ) [] PROGRESS NOTE [x] DISCHARGE NOTE    [] [facility]   [x] [facility] [address]    [] [facility]   [] Mildred Sandoval    Date: 10/8/2021  Patient Name:  Anastasia Quiroz  : 1945  MRN: [id number]  CSN: [id number]    Referring Practitioner Abigail Cadet MD   Diagnosis Low back pain [M54.5]    Treatment Diagnosis LBP, difficulty walking   Date of Evaluation 21   Additional Pertinent History High cholesterol. L knee OA      Functional Outcome Measure Used [facility] back disability   Functional Outcome Score eval score 73(21) , eval 5      Insurance: Primary: Payor: Melany Whitley /  /  / ,   Secondary:    Authorization Information: Pays at 80%, modalities covered except ionto/MHP/CP not covered   Visit # 8, 8/10 for progress note   Visits Allowed: Unlimited visits   Recertification Date:    Physician Follow-Up: 2021   Physician Orders:    History of Present Illness:      SUBJECTIVE: reports having no LBP x 1 week.   Walked 9 holes of golf today without pain  OBJECTIVE:  TREATMENT   Precautions: None currently   Pain: 0/10    X in shaded column indicates activity completed today   Modalities Parameters/  Location  Notes                     Manual Therapy Time/Technique  Notes                     Exercise/Intervention   Notes   Abdominal bracing knees bent 15 5     Abdominal bracing with quad set R/L 15 5     Abdominal bracing with SLR flex and extension  15 5 x Ea, B   Prone, alt UE flexion x5  x    Prone prop  Prone press up       10 x 2 min X    x    Abdominal bracing with bridges x15 5 x    Abdominal bracing with SL clamshells, hip abduction 10      Abdominal bracing with marching, shoulder flexion and combo UE/LE  15      Abdominal bracing with LAQ 1/2 way in chair, alternating legs 15 5 x    Abdominal bracing with marching, seated x10  x    LTR  5 5 x Cues to not go so far as patient twisting trunk and pain side of trunk    education to avoid flexion and twisting spine, bend at knees, move feet       Nustep, seat 2 holes showing, UE 10, level 5 5 minutes  x    Step-ups 2 inch, fwd 10 B x                                  Specific Interventions Next Treatment: neutral DLSP supine/seated, avoid L knee pain with exercises, progress to prone,prop avoiding increased pain, modalities to include estim as needed for pain control    Activity/Treatment Tolerance:  [x]  Patient tolerated treatment well  []  Patient limited by fatigue  []  Patient limited by pain   []  Patient limited by medical complications  []  Other:     Assessment:  Patient has made excellent progress with all LBP abolished. GOALS:  Patient Goal: to get my pain to go away    Short Term Goals:  Time Frame: deferred to LTG's      Long Term Goals:  Time Frame: 5 weeks  1. Increase AROM lumbar extension to 50%, lateral flexion R to 75%, B hip flexion to 70, R knee flexion to 130 degrees to allow patient to report able to golf 9 holes with decreased pain 1/10. MET LUMBAR EXTENSION 50%, LATERAL FLEXION b 75%, EXTENSION 75%, B HIP FLEXION 80 DEGREES, R KNEE FLEXION 130 DEGREES, NO PAIN WITH GOLFING  2. Increase strength abdominal strength to fair, B LE strength to 4/5 to allow patient to do lawn /garden work with decreased pain to 1/10. MET ABDOMINAL STRENGTH FAIR, LE 4/5, NO PAIN WITH LAWN/GARDEN WORK. 3. I with HEP as prescribed to allow patient to sleep through night without awakening due to back pain. MET WITH HEP AND NO PAIN    Patient Education:   [x]  HEP/Education Completed: Plan of Care, Goals, handout with prone press up     []  No new Education completed  [x]  Reviewed Prior HEP      []  Patient verbalized and/or demonstrated understanding of education provided.   []  Patient unable to verbalize and/or demonstrate understanding of education provided. Will continue education.   [x]  Barriers to learning: none    PLAN:    [x]  Discharge    Time In 1300   Time Out 1330   Timed Code Minutes: 30 min   Total Treatment Time: 30 min       Electronically Signed by: Juli Gil PT

## 2021-10-21 ENCOUNTER — TELEPHONE (OUTPATIENT)
Dept: FAMILY MEDICINE CLINIC | Age: 76
End: 2021-10-21

## 2021-10-21 DIAGNOSIS — E78.00 PURE HYPERCHOLESTEROLEMIA: ICD-10-CM

## 2021-10-21 DIAGNOSIS — Z00.00 ANNUAL PHYSICAL EXAM: Primary | ICD-10-CM

## 2021-10-21 DIAGNOSIS — R73.09 ELEVATED GLUCOSE: ICD-10-CM

## 2021-10-21 DIAGNOSIS — Z13.1 SCREENING FOR DIABETES MELLITUS: ICD-10-CM

## 2021-10-21 DIAGNOSIS — Z12.5 SCREENING FOR PROSTATE CANCER: ICD-10-CM

## 2021-10-21 NOTE — TELEPHONE ENCOUNTER
----- Message from Briana Nails sent at 10/21/2021  9:07 AM EDT -----  Subject: Referral Request    QUESTIONS   Reason for referral request? Patient is scheduled for a physical with Dr. Conner Burgos on 12/02/2021 and is requesting lab work prior to visit, he states   he was told to have them done, there are no open orders in epic. Has the physician seen you for this condition before? Yes  Select a date? 2020-11-01  Select the Provider the patient wants to be referred to, if known (PCP or   Specialist)? Ashely Dias   Preferred Specialist (if applicable)? Do you already have an appointment scheduled? No  Additional Information for Provider?   ---------------------------------------------------------------------------  --------------  CALL BACK INFO  What is the best way for the office to contact you? OK to leave message on   voicemail  Preferred Call Back Phone Number?  [phone]

## 2021-10-22 NOTE — TELEPHONE ENCOUNTER
Spoke with Colemanareli Pruitt and informed him to wait to have labs done until after 11/20/21 he verbalizes understanding

## 2021-10-22 NOTE — TELEPHONE ENCOUNTER
Labs ordered. Needs to wait until 11/20 or later to get labs so PSA is at least 1 year from previous. Please advise patient.   Zulema Mendoza MD

## 2021-11-29 ENCOUNTER — HOSPITAL ENCOUNTER (OUTPATIENT)
Age: 76
Discharge: HOME | End: 2021-11-29
Payer: COMMERCIAL

## 2021-11-29 DIAGNOSIS — E78.00 PURE HYPERCHOLESTEROLEMIA: ICD-10-CM

## 2021-11-29 DIAGNOSIS — Z00.00 ANNUAL PHYSICAL EXAM: ICD-10-CM

## 2021-11-29 DIAGNOSIS — Z12.5 SCREENING FOR PROSTATE CANCER: ICD-10-CM

## 2021-11-29 DIAGNOSIS — Z13.1 SCREENING FOR DIABETES MELLITUS: ICD-10-CM

## 2021-11-29 DIAGNOSIS — R73.09 ELEVATED GLUCOSE: ICD-10-CM

## 2021-11-29 LAB
ALBUMIN SERPL-MCNC: 4.8 G/DL (ref 3.5–5.1)
ALP BLD-CCNC: 88 U/L (ref 38–126)
ALT SERPL-CCNC: 25 U/L (ref 11–66)
ANION GAP SERPL CALCULATED.3IONS-SCNC: 15 MEQ/L (ref 8–16)
AST SERPL-CCNC: 28 U/L (ref 5–40)
AVERAGE GLUCOSE: 114 MG/DL (ref 70–126)
BILIRUB SERPL-MCNC: 0.6 MG/DL (ref 0.3–1.2)
BUN BLDV-MCNC: 28 MG/DL (ref 7–22)
CALCIUM SERPL-MCNC: 10.1 MG/DL (ref 8.5–10.5)
CHLORIDE BLD-SCNC: 105 MEQ/L (ref 98–111)
CHOLESTEROL, TOTAL: 206 MG/DL (ref 100–199)
CO2: 25 MEQ/L (ref 23–33)
CREAT SERPL-MCNC: 1 MG/DL (ref 0.4–1.2)
GFR SERPL CREATININE-BSD FRML MDRD: 72 ML/MIN/1.73M2
GLUCOSE BLD-MCNC: 108 MG/DL (ref 70–108)
HBA1C MFR BLD: 5.8 % (ref 4.4–6.4)
HDLC SERPL-MCNC: 61 MG/DL
LDL CHOLESTEROL CALCULATED: 113 MG/DL
POTASSIUM SERPL-SCNC: 4.6 MEQ/L (ref 3.5–5.2)
PROSTATE SPECIFIC ANTIGEN: 0.61 NG/ML (ref 0–1)
SODIUM BLD-SCNC: 145 MEQ/L (ref 135–145)
TOTAL PROTEIN: 7.4 G/DL (ref 6.1–8)
TRIGL SERPL-MCNC: 159 MG/DL (ref 0–199)

## 2021-11-29 PROCEDURE — 83036 HEMOGLOBIN GLYCOSYLATED A1C: CPT

## 2021-11-29 PROCEDURE — 36415 COLL VENOUS BLD VENIPUNCTURE: CPT

## 2021-11-29 PROCEDURE — 80053 COMPREHEN METABOLIC PANEL: CPT

## 2021-11-29 PROCEDURE — G0103 PSA SCREENING: HCPCS

## 2021-11-29 PROCEDURE — 80061 LIPID PANEL: CPT

## 2021-12-02 ENCOUNTER — OFFICE VISIT (OUTPATIENT)
Dept: FAMILY MEDICINE CLINIC | Age: 76
End: 2021-12-02
Payer: COMMERCIAL

## 2021-12-02 VITALS
DIASTOLIC BLOOD PRESSURE: 70 MMHG | WEIGHT: 222.4 LBS | HEIGHT: 74 IN | TEMPERATURE: 96.3 F | SYSTOLIC BLOOD PRESSURE: 130 MMHG | OXYGEN SATURATION: 98 % | HEART RATE: 78 BPM | BODY MASS INDEX: 28.54 KG/M2

## 2021-12-02 DIAGNOSIS — G89.29 CHRONIC PAIN OF LEFT KNEE: ICD-10-CM

## 2021-12-02 DIAGNOSIS — E78.00 PURE HYPERCHOLESTEROLEMIA: ICD-10-CM

## 2021-12-02 DIAGNOSIS — Z23 NEEDS FLU SHOT: ICD-10-CM

## 2021-12-02 DIAGNOSIS — M25.562 CHRONIC PAIN OF LEFT KNEE: ICD-10-CM

## 2021-12-02 DIAGNOSIS — M17.12 PRIMARY OSTEOARTHRITIS OF LEFT KNEE: ICD-10-CM

## 2021-12-02 DIAGNOSIS — K21.9 GASTROESOPHAGEAL REFLUX DISEASE WITHOUT ESOPHAGITIS: Primary | ICD-10-CM

## 2021-12-02 PROCEDURE — 90694 VACC AIIV4 NO PRSRV 0.5ML IM: CPT | Performed by: FAMILY MEDICINE

## 2021-12-02 PROCEDURE — G8484 FLU IMMUNIZE NO ADMIN: HCPCS | Performed by: FAMILY MEDICINE

## 2021-12-02 PROCEDURE — 90471 IMMUNIZATION ADMIN: CPT | Performed by: FAMILY MEDICINE

## 2021-12-02 PROCEDURE — 1036F TOBACCO NON-USER: CPT | Performed by: FAMILY MEDICINE

## 2021-12-02 PROCEDURE — 4040F PNEUMOC VAC/ADMIN/RCVD: CPT | Performed by: FAMILY MEDICINE

## 2021-12-02 PROCEDURE — 20610 DRAIN/INJ JOINT/BURSA W/O US: CPT | Performed by: FAMILY MEDICINE

## 2021-12-02 PROCEDURE — G8417 CALC BMI ABV UP PARAM F/U: HCPCS | Performed by: FAMILY MEDICINE

## 2021-12-02 PROCEDURE — 1123F ACP DISCUSS/DSCN MKR DOCD: CPT | Performed by: FAMILY MEDICINE

## 2021-12-02 PROCEDURE — G8427 DOCREV CUR MEDS BY ELIG CLIN: HCPCS | Performed by: FAMILY MEDICINE

## 2021-12-02 PROCEDURE — 99214 OFFICE O/P EST MOD 30 MIN: CPT | Performed by: FAMILY MEDICINE

## 2021-12-02 RX ORDER — LOVASTATIN 20 MG/1
20 TABLET ORAL DAILY
Qty: 90 TABLET | Refills: 1 | Status: SHIPPED | OUTPATIENT
Start: 2021-12-02 | End: 2022-06-02 | Stop reason: SDUPTHER

## 2021-12-02 RX ORDER — TRIAMCINOLONE ACETONIDE 40 MG/ML
80 INJECTION, SUSPENSION INTRA-ARTICULAR; INTRAMUSCULAR ONCE
Status: COMPLETED | OUTPATIENT
Start: 2021-12-02 | End: 2021-12-02

## 2021-12-02 RX ORDER — BUPIVACAINE HYDROCHLORIDE 5 MG/ML
4 INJECTION, SOLUTION PERINEURAL ONCE
Status: COMPLETED | OUTPATIENT
Start: 2021-12-02 | End: 2021-12-02

## 2021-12-02 RX ORDER — LIDOCAINE HYDROCHLORIDE 10 MG/ML
4 INJECTION, SOLUTION INFILTRATION; PERINEURAL ONCE
Status: COMPLETED | OUTPATIENT
Start: 2021-12-02 | End: 2021-12-02

## 2021-12-02 RX ORDER — ESOMEPRAZOLE MAGNESIUM 40 MG/1
40 CAPSULE, DELAYED RELEASE ORAL
Qty: 90 CAPSULE | Refills: 1 | Status: SHIPPED | OUTPATIENT
Start: 2021-12-02 | End: 2022-06-02 | Stop reason: SDUPTHER

## 2021-12-02 RX ADMIN — LIDOCAINE HYDROCHLORIDE 4 ML: 10 INJECTION, SOLUTION INFILTRATION; PERINEURAL at 10:10

## 2021-12-02 RX ADMIN — BUPIVACAINE HYDROCHLORIDE 20 MG: 5 INJECTION, SOLUTION PERINEURAL at 10:09

## 2021-12-02 RX ADMIN — TRIAMCINOLONE ACETONIDE 80 MG: 40 INJECTION, SUSPENSION INTRA-ARTICULAR; INTRAMUSCULAR at 10:11

## 2021-12-02 ASSESSMENT — ENCOUNTER SYMPTOMS
ABDOMINAL PAIN: 0
WHEEZING: 0
BLOOD IN STOOL: 0
SHORTNESS OF BREATH: 0

## 2021-12-02 NOTE — PROGRESS NOTES
[facility]  [facility] MEDICINE  [address] [facility]  Dept: [phone]  Dept Fax: [phone]  Loc: [phone]  PROGRESS NOTE      Visit Date: 12/2/2021    Janice Kelley is a 67 y.o. male who presents today for:  Chief Complaint   Patient presents with    6 Month Follow-Up    Gastroesophageal Reflux     still the same     Knee Pain     left knee hurts all the time om pain scale its usually a 7    Flu Vaccine       Subjective:  HPI     6 month f/u    Hypercholesterolemia: On lovastatin. No myalgias. LDL of 113 a few days ago.     GERD:  On nexium as needed. No abd pain or nausea. A1c 5.8%      Left knee pain:  Pain is 7/10. Over 5 years. Pain is medially. Xray 1 year ago showed OA. Takes aleve twice daily. Has given up tennis due to this. Has not had a steroid injection previously. Pain is worse with weightbearing and stairs    Review of Systems   Constitutional: Negative for chills and fever. Respiratory: Negative for shortness of breath and wheezing. Cardiovascular: Negative for chest pain. Gastrointestinal: Negative for abdominal pain and blood in stool. Genitourinary: Negative for dysuria and frequency. Skin: Negative for rash and wound.      Patient Active Problem List   Diagnosis    History of cold sores    Hyperlipidemia    Glucose intolerance (impaired glucose tolerance)    Hematochezia    Constipation    Pre-diabetes    Renal cysts, acquired, bilateral    Primary osteoarthritis of left knee     Past Medical History:   Diagnosis Date    Arthritis     Erectile dysfunction     Glucose intolerance (impaired glucose tolerance)     Hyperlipidemia     Renal cyst, left 7-2001    Dr. Kim Winston      Past Surgical History:   Procedure Laterality Date    COLONOSCOPY      TONSILLECTOMY      UPPER GASTROINTESTINAL ENDOSCOPY       Family History   Problem Relation Age of Onset    Cancer Mother    Nanci Rivera Alcohol Abuse Father     Cancer Sister     Diabetes Maternal Grandfather      Social History     Tobacco Use    Smoking status: Never Smoker    Smokeless tobacco: Never Used   Substance Use Topics    Alcohol use: No      Current Outpatient Medications   Medication Sig Dispense Refill    lovastatin (MEVACOR) 20 MG tablet Take 1 tablet by mouth daily 90 tablet 1    esomeprazole (NEXIUM) 40 MG delayed release capsule Take 1 capsule by mouth every morning (before breakfast) 90 capsule 1    Naproxen Sodium (ALEVE) 220 MG CAPS Take by mouth      aspirin 81 MG tablet Take 81 mg by mouth daily      predniSONE (DELTASONE) 10 MG tablet Take 1 tablet by mouth daily Take 5 tabs daily for 3 days, then 4 tabs daily for 3 days, then 3 tabs daily for 3 days, then 2 tabs daily for 3 days, then 1 tab daily for 3 days. (Patient not taking: Reported on 12/2/2021) 45 tablet 0     No current facility-administered medications for this visit. No Known Allergies  Health Maintenance   Topic Date Due    Hepatitis C screen  Never done    Annual Wellness Visit (AWV)  Never done    Flu vaccine (1) 09/01/2021    Lipid screen  11/29/2022    DTaP/Tdap/Td vaccine (2 - Td or Tdap) 04/05/2028    Shingles Vaccine  Completed    Pneumococcal 65+ years Vaccine  Completed    COVID-19 Vaccine  Completed    Hepatitis A vaccine  Aged Out    Hepatitis B vaccine  Aged Out    Hib vaccine  Aged Out    Meningococcal (ACWY) vaccine  Aged Out       Objective:  /70 (Site: Left Upper Arm, Position: Sitting)   Pulse 78   Temp 96.3 °F (35.7 °C)   Ht 6' 2\" (1.88 m)   Wt 222 lb 6.4 oz (100.9 kg)   SpO2 98%   BMI 28.55 kg/m²   Physical Exam  Vitals reviewed. Constitutional:       General: He is not in acute distress. Appearance: He is not ill-appearing. HENT:      Nose: Nose normal. No mucosal edema. Right Nostril: No epistaxis or septal hematoma. Left Nostril: No epistaxis or septal hematoma.    Cardiovascular: Rate and Rhythm: Normal rate and regular rhythm. Heart sounds: Murmur heard. Systolic murmur is present with a grade of 1/6. Pulmonary:      Effort: Pulmonary effort is normal. No respiratory distress. Breath sounds: No wheezing or rhonchi. Neurological:      Mental Status: He is alert. Mental status is at baseline. Psychiatric:         Mood and Affect: Mood normal.         Behavior: Behavior normal.       Left knee: No effusion. Lacks about 2 degrees extension. Flexion to 120 degrees. Negative valgus and varus. Lab Results   Component Value Date    WBC 5.4 09/28/2019    HGB 15.5 09/28/2019    HCT 45.6 09/28/2019     09/28/2019    CHOL 206 (H) 11/29/2021    TRIG 159 11/29/2021    HDL 61 11/29/2021    ALT 25 11/29/2021    AST 28 11/29/2021     11/29/2021    K 4.6 11/29/2021     11/29/2021    CREATININE 1.0 11/29/2021    BUN 28 (H) 11/29/2021    CO2 25 11/29/2021    TSH 2.40 09/27/2014    PSA 0.61 11/29/2021    LABA1C 5.8 11/29/2021         Procedure Note left Knee Injection    Discussed risks and benefits of steroid injection, including but not limited to infection, skin discoloration, pain, and bleeding. With the patient's verbal informed consent, his left knee was prepped  in standard sterile fashion with Betadine and Alcohol. Ethyl chloride was used to anesthetize the skin. A mixture of 4 cc of 0.5% Bupivacaine, 4 cc of 1% Lidocaine,  and 2 cc of Kenalog 40 mg was injected into the left anterior lateral joint space. The patient tolerated this well without difficulty. A band-aid was applied and the patient was advised to ice the knee for 15-20 minutes to relieve any injection site related pain. Impression/Plan:  1. Gastroesophageal reflux disease without esophagitis  Chronic. Controlled. Refill med  - esomeprazole (NEXIUM) 40 MG delayed release capsule; Take 1 capsule by mouth every morning (before breakfast)  Dispense: 90 capsule; Refill: 1    2.  Pure hypercholesterolemia  Chronic. Controlled. Refill meds  - lovastatin (MEVACOR) 20 MG tablet; Take 1 tablet by mouth daily  Dispense: 90 tablet; Refill: 1    3. Chronic pain of left knee  Chronic. Due to arthritis. Discussed initial treatment options including Tylenol, Aleve, topical medication, home exercise program.  He declines PT at this time. He would like to proceed with steroid injection which was performed as described above. recommend ice and Tylenol  - AL ARTHROCENTESIS ASPIR&/INJ MAJOR JT/BURSA W/O US  - lidocaine 1 % injection 4 mL  - bupivacaine (MARCAINE) 0.5 % injection 20 mg  - triamcinolone acetonide (KENALOG-40) injection 80 mg    4. Primary osteoarthritis of left knee  - AL ARTHROCENTESIS ASPIR&/INJ MAJOR JT/BURSA W/O US  - lidocaine 1 % injection 4 mL  - bupivacaine (MARCAINE) 0.5 % injection 20 mg  - triamcinolone acetonide (KENALOG-40) injection 80 mg    5. Needs flu shot  - INFLUENZA, QUADV, ADJUVANTED, 65 YRS =, IM, PF, PREFILL SYR, 0.5ML (FLUAD)    Reviewed recent PSA, lipids, A1c and CMP from 11/29/2021    They voiced understanding. All questions answered. They agreed with treatment plan. See patient instructions for any educational materials that may have been given. Discussed use, benefit, and side effects of prescribed medications. Reviewed health maintenance. (Please note that portions of this note may have been completed with a voice recognition program.  Efforts were made to edit the dictation but occasionally words are mis-transcribed.)    Return in about 6 months (around 6/2/2022) for GERD and left knee pain.       Electronically signed by Yuki Lowe MD on 12/2/2021 at 9:20 AM

## 2021-12-02 NOTE — PROGRESS NOTES
After obtaining consent, and per orders of Dr Rae Gomez injection of Influenza vaccine 0.5 mL IM given by [facility]. Patient tolerated well.       Immunization(s) given during visit:    Immunizations Administered     Name Date Dose Route    Influenza, Quadv, adjuvanted, 65 yrs +, IM, PF (Fluad) 12/2/2021 0.5 mL Intramuscular    Site: Deltoid- Left    Lot: [id number]    NDC: 36259-088-78          Most recent Vaccine Information Sheet dated 12/1/2021 given to pt

## 2021-12-02 NOTE — PROGRESS NOTES
Medication(s) given during visit:    Administrations This Visit     bupivacaine (MARCAINE) 0.5 % injection 20 mg     Admin Date  12/02/2021  10:09 Action  Given Dose  20 mg Route  Intra-artICUlar Site  Knee Left Administered By  Saul Newton LPN    Ordering Provider: Tarun Morataya MD    NDC: 7523-6333-96    Lot#: [id number]    : Ty Valladares    Patient Supplied?: No          lidocaine 1 % injection 4 mL     Admin Date  12/02/2021  10:10 Action  Given Dose  4 mL Route  Other Site  Knee Left Administered By  Saul Newton LPN    Ordering Provider: Tarun Morataya MD    NDC: 9522-5362-71    Lot#: [id number]    : MEGAN    Patient Supplied?: No          triamcinolone acetonide (KENALOG-40) injection 80 mg     Admin Date  12/02/2021  10:11 Action  Given Dose  80 mg Route  Intra-artICUlar Site  Knee Left Administered By  Saul Newton LPN    Ordering Provider: Tarun Morataya MD    NDC: 7806-2138-27    Lot#: [id number]    : [employer] (PRIMARY CARE)    Patient Supplied?: No                Patient instructed to remain in clinic for 20 minutes after injection and was advised to report any adverse reaction to me immediately.

## 2022-01-10 ENCOUNTER — TELEPHONE (OUTPATIENT)
Dept: FAMILY MEDICINE CLINIC | Age: 77
End: 2022-01-10

## 2022-01-10 ENCOUNTER — HOSPITAL ENCOUNTER (EMERGENCY)
Age: 77
Discharge: HOME | End: 2022-01-10
Payer: COMMERCIAL

## 2022-01-10 VITALS
SYSTOLIC BLOOD PRESSURE: 125 MMHG | HEART RATE: 91 BPM | OXYGEN SATURATION: 97 % | TEMPERATURE: 96.9 F | BODY MASS INDEX: 28.23 KG/M2 | WEIGHT: 220 LBS | HEIGHT: 74 IN | DIASTOLIC BLOOD PRESSURE: 72 MMHG | RESPIRATION RATE: 16 BRPM

## 2022-01-10 DIAGNOSIS — J02.9 VIRAL PHARYNGITIS: Primary | ICD-10-CM

## 2022-01-10 PROCEDURE — 99212 OFFICE O/P EST SF 10 MIN: CPT | Performed by: NURSE PRACTITIONER

## 2022-01-10 PROCEDURE — 99213 OFFICE O/P EST LOW 20 MIN: CPT

## 2022-01-10 ASSESSMENT — PAIN SCALES - GENERAL: PAINLEVEL_OUTOF10: 6

## 2022-01-10 ASSESSMENT — ENCOUNTER SYMPTOMS
COUGH: 1
SORE THROAT: 1
SINUS PRESSURE: 0
SHORTNESS OF BREATH: 0
VOMITING: 0
DIARRHEA: 0
NAUSEA: 0

## 2022-01-10 ASSESSMENT — PAIN - FUNCTIONAL ASSESSMENT: PAIN_FUNCTIONAL_ASSESSMENT: ACTIVITIES ARE NOT PREVENTED

## 2022-01-10 ASSESSMENT — PAIN DESCRIPTION - FREQUENCY: FREQUENCY: CONTINUOUS

## 2022-01-10 ASSESSMENT — PAIN DESCRIPTION - LOCATION: LOCATION: THROAT

## 2022-01-10 ASSESSMENT — PAIN DESCRIPTION - PROGRESSION: CLINICAL_PROGRESSION: GRADUALLY WORSENING

## 2022-01-10 ASSESSMENT — PAIN DESCRIPTION - PAIN TYPE: TYPE: ACUTE PAIN

## 2022-01-10 ASSESSMENT — PAIN DESCRIPTION - ONSET: ONSET: GRADUAL

## 2022-01-10 ASSESSMENT — PAIN DESCRIPTION - DESCRIPTORS: DESCRIPTORS: SHARP

## 2022-01-10 NOTE — ED PROVIDER NOTES
[address]  Urgent Care Encounter       CHIEF COMPLAINT       Chief Complaint   Patient presents with    Pharyngitis       Nurses Notes reviewed and I agree except as noted in the HPI. HISTORY OF PRESENT ILLNESS   Radha Chan is a 67 y.o. male who presents with complaints of a sore throat, onset 5 days ago. Patient originally started with cold symptoms with cough and nasal congestion and some few body aches. All the symptoms have resolved with the exception of the sore throat that continues. He denies fever and chills, nausea, vomiting diarrhea. Cough is very minimal at this time. He is vaccinated and boosted for Covid. The history is provided by the patient. REVIEW OF SYSTEMS     Review of Systems   Constitutional: Negative for chills and fever. HENT: Positive for sore throat. Negative for congestion (Resolved), ear pain and sinus pressure. Respiratory: Positive for cough (Minimal). Negative for shortness of breath. Cardiovascular: Negative for chest pain. Gastrointestinal: Negative for diarrhea, nausea and vomiting. Musculoskeletal: Negative for myalgias (Called). Skin: Negative for rash. Neurological: Negative for headaches. PAST MEDICAL HISTORY         Diagnosis Date    Arthritis     Erectile dysfunction     Glucose intolerance (impaired glucose tolerance)     Hyperlipidemia     Renal cyst, left 7-2001    Dr. Salvatore Ospina     Patient  has a past surgical history that includes Tonsillectomy; Colonoscopy; and Upper gastrointestinal endoscopy.     CURRENT MEDICATIONS       Current Discharge Medication List      CONTINUE these medications which have NOT CHANGED    Details   esomeprazole (NEXIUM) 40 MG delayed release capsule Take 1 capsule by mouth every morning (before breakfast)  Qty: 90 capsule, Refills: 1    Associated Diagnoses: Gastroesophageal reflux disease without esophagitis      lovastatin (MEVACOR) 20 MG tablet Take 1 tablet by mouth daily  Qty: 90 tablet, Refills: 1    Associated Diagnoses: Pure hypercholesterolemia      Naproxen Sodium (ALEVE) 220 MG CAPS Take by mouth      aspirin 81 MG tablet Take 81 mg by mouth daily             ALLERGIES     Patient is has No Known Allergies. Patients   Immunization History   Administered Date(s) Administered    COVID-19, Moderna, Primary or Immunocompromised, PF, 100mcg/0.5mL 01/28/2021, 02/23/2021, 11/10/2021    Influenza Vaccine, unspecified formulation 12/18/2014, 12/09/2017    Influenza Virus Vaccine 11/05/2015, 12/09/2017, 10/31/2018    Influenza, Quadv, adjuvanted, 65 yrs +, IM, PF (Fluad) 11/05/2020, 12/02/2021    Influenza, Triv, inactivated, subunit, adjuvanted, IM (Fluad 65 yrs and older) 11/04/2019    Pneumococcal Conjugate 13-valent ([id number]) 07/24/2017    Pneumococcal Polysaccharide ([id number]) 11/07/2018    Td, unspecified formulation 06/17/2011    Tdap (Boostrix, Adacel) 04/05/2018    Zoster Recombinant (Shingrix) 10/18/2019, 01/15/2020       FAMILY HISTORY     Patient's family history includes Alcohol Abuse in his father; Cancer in his mother and sister; Diabetes in his maternal grandfather. SOCIAL HISTORY     Patient  reports that he has never smoked. He has never used smokeless tobacco. He reports that he does not drink alcohol and does not use drugs. PHYSICAL EXAM     ED TRIAGE VITALS  BP: 125/72, Temp: 96.9 °F (36.1 °C), Pulse: 91, Resp: 16, SpO2: 97 %,Estimated body mass index is 28.25 kg/m² as calculated from the following:    Height as of this encounter: 6' 2\" (1.88 m). Weight as of this encounter: 220 lb (99.8 kg). ,No LMP for male patient. Physical Exam  Vitals and nursing note reviewed. Constitutional:       General: He is not in acute distress. Appearance: He is well-developed. He is not ill-appearing. HENT:      Head: Normocephalic and atraumatic.       Right Ear: Tympanic membrane and ear canal normal.      Left Ear: Tympanic membrane and ear canal normal.      Mouth/Throat:      Lips: Pink. Mouth: Mucous membranes are moist.      Pharynx: Oropharynx is clear. Posterior oropharyngeal erythema (Mild) present. No pharyngeal swelling or oropharyngeal exudate. Eyes:      General: Lids are normal. No scleral icterus. Conjunctiva/sclera: Conjunctivae normal.      Pupils: Pupils are equal.   Cardiovascular:      Rate and Rhythm: Normal rate and regular rhythm. Heart sounds: Normal heart sounds, S1 normal and S2 normal.   Pulmonary:      Effort: Pulmonary effort is normal. No respiratory distress. Breath sounds: Normal breath sounds. Musculoskeletal:      Comments: Normal active ROM x 4 extremities  Gait steady   Lymphadenopathy:      Comments: No head or neck adenopathy   Skin:     General: Skin is warm and dry. Findings: No rash (to exposed skin). Neurological:      General: No focal deficit present. Mental Status: He is alert and oriented to person, place, and time. Sensory: No sensory deficit. Comments: Answers questions readily and appropriately   Psychiatric:         Mood and Affect: Mood normal.         Speech: Speech normal.         Behavior: Behavior normal. Behavior is cooperative. DIAGNOSTIC RESULTS     Labs:No results found for this visit on 01/10/22. IMAGING:    No orders to display         EKG:      URGENT CARE COURSE:     Vitals:    01/10/22 1023   BP: 125/72   Pulse: 91   Resp: 16   Temp: 96.9 °F (36.1 °C)   TempSrc: Temporal   SpO2: 97%   Weight: 220 lb (99.8 kg)   Height: 6' 2\" (1.88 m)       Medications - No data to display         PROCEDURES:  None    FINAL IMPRESSION      1. Viral pharyngitis          DISPOSITION/ PLAN     Patient presents with acute pharyngitis, most likely viral in etiology. Had other respiratory symptoms had onset but these have resolved. Patient can use over-the-counter medications for symptom management.   Patient encouraged to avoid others and wear a mask in public. Follow-up family doctor in the next 3 to 4 days if not improved. Further instructions were outlined verbally and in the patient's discharge instructions. All the patient's questions were answered. The patient/parent agreed with the plan and was discharged from the [facility] in good condition. PATIENT REFERRED TO:  Rajesh Cosby MD  [address] [place] [address] / [place] [phone]      DISCHARGE MEDICATIONS:  Current Discharge Medication List          Current Discharge Medication List          Current Discharge Medication List          GUDELIA Davis CNP    (Please note that portions of this note were completed with a voice recognition program. Efforts were made to edit the dictations but occasionally words are mis-transcribed.)         GUDELIA Khan CNP  01/11/22 2891

## 2022-01-10 NOTE — TELEPHONE ENCOUNTER
----- Message from Norbert Giron sent at 1/10/2022  9:26 AM EST -----  Subject: Appointment Request    Reason for Call: Urgent Cough Cold    QUESTIONS  Type of Appointment? Established Patient  Reason for appointment request? No appointments available during search  Additional Information for Provider? pt started 5 days ago with a sore   throat and a cough. pt would like an appt set up.   ---------------------------------------------------------------------------  --------------  [employer]  What is the best way for the office to contact you? OK to leave message on   voicemail  Preferred Call Back Phone Number? [phone]  ---------------------------------------------------------------------------  --------------  SCRIPT ANSWERS  Relationship to Patient? Self  Are you currently unable to finish sentences due to any difficulty   breathing? No  Are you unable to swallow liquids? No  Are you having fevers (100.4 or greater), chills, or sweats? No  Do you have COPD, asthma or a chronic lung condition? No  Have your symptoms been present for more than 5 days? Yes   Have you been diagnosed with, awaiting test results for, or told that you   are suspected of having COVID-19 (Coronavirus)? (If patient has tested   negative or was tested as a requirement for work, school, or travel and   not based on symptoms, answer no)? No  Within the past two weeks have you developed any of the following symptoms   (answer no if symptoms have been present longer than 2 weeks or began   more than 2 weeks ago)? Fever or Chills, Cough, Shortness of breath or   difficulty breathing, Loss of taste or smell, Sore throat, Nasal   congestion, Sneezing or runny nose, Fatigue or generalized body aches   (answer no if pain is specific to a body part e.g. back pain), Diarrhea,   Headache?  Yes

## 2022-01-10 NOTE — TELEPHONE ENCOUNTER
Spoke with Clarissa Agrawal regarding his sore throat and cough, suggested a Rapid COVID test prior to scheduling a appointment. He advised he is sitting in lobby at urgent care waiting to be seen.

## 2022-01-10 NOTE — ED NOTES
Discharge assessment complete. No changes. All discharge education and information given. Instructed to go to ED for any worsening symptoms. Verbalized Understanding. Left in stable cond.      Dmitri Jackson RN  01/10/22 0621

## 2022-01-14 ENCOUNTER — TELEPHONE (OUTPATIENT)
Dept: FAMILY MEDICINE CLINIC | Age: 77
End: 2022-01-14

## 2022-01-14 NOTE — TELEPHONE ENCOUNTER
Positive home COVID test, Tiesha De Leon feels OK does question if he should be checking his pulse ox at home , denies SOB.  Advised yes to pulse ox , increase hydration , tylenol aches , fever

## 2022-05-18 ENCOUNTER — TELEPHONE (OUTPATIENT)
Dept: FAMILY MEDICINE CLINIC | Age: 77
End: 2022-05-18

## 2022-05-18 NOTE — TELEPHONE ENCOUNTER
----- Message from Barbara Mckeon sent at 5/18/2022  8:55 AM EDT -----  Subject: Message to Provider    QUESTIONS  Information for Provider? Patient wants to know if he needs bloodwork done   for his apt June 2  ---------------------------------------------------------------------------  --------------  [employer] INFO  What is the best way for the office to contact you? OK to leave message on   voicemail  Preferred Call Back Phone Number?  [phone]  ---------------------------------------------------------------------------  --------------  SCRIPT ANSWERS  undefined

## 2022-06-02 ENCOUNTER — OFFICE VISIT (OUTPATIENT)
Dept: FAMILY MEDICINE CLINIC | Age: 77
End: 2022-06-02
Payer: COMMERCIAL

## 2022-06-02 VITALS
HEIGHT: 74 IN | WEIGHT: 201 LBS | TEMPERATURE: 97 F | DIASTOLIC BLOOD PRESSURE: 64 MMHG | OXYGEN SATURATION: 98 % | BODY MASS INDEX: 25.8 KG/M2 | SYSTOLIC BLOOD PRESSURE: 122 MMHG | RESPIRATION RATE: 16 BRPM | HEART RATE: 74 BPM

## 2022-06-02 DIAGNOSIS — K21.9 GASTROESOPHAGEAL REFLUX DISEASE WITHOUT ESOPHAGITIS: ICD-10-CM

## 2022-06-02 DIAGNOSIS — M25.511 ACUTE PAIN OF RIGHT SHOULDER: Primary | ICD-10-CM

## 2022-06-02 DIAGNOSIS — E78.00 PURE HYPERCHOLESTEROLEMIA: ICD-10-CM

## 2022-06-02 PROCEDURE — 1123F ACP DISCUSS/DSCN MKR DOCD: CPT | Performed by: FAMILY MEDICINE

## 2022-06-02 PROCEDURE — 99214 OFFICE O/P EST MOD 30 MIN: CPT | Performed by: FAMILY MEDICINE

## 2022-06-02 PROCEDURE — 1036F TOBACCO NON-USER: CPT | Performed by: FAMILY MEDICINE

## 2022-06-02 PROCEDURE — G8417 CALC BMI ABV UP PARAM F/U: HCPCS | Performed by: FAMILY MEDICINE

## 2022-06-02 PROCEDURE — G8427 DOCREV CUR MEDS BY ELIG CLIN: HCPCS | Performed by: FAMILY MEDICINE

## 2022-06-02 RX ORDER — LOVASTATIN 20 MG/1
20 TABLET ORAL DAILY
Qty: 90 TABLET | Refills: 1 | Status: SHIPPED | OUTPATIENT
Start: 2022-06-02

## 2022-06-02 RX ORDER — ESOMEPRAZOLE MAGNESIUM 40 MG/1
40 CAPSULE, DELAYED RELEASE ORAL
Qty: 90 CAPSULE | Refills: 1 | Status: SHIPPED | OUTPATIENT
Start: 2022-06-02

## 2022-06-02 ASSESSMENT — ENCOUNTER SYMPTOMS
ABDOMINAL PAIN: 0
BLOOD IN STOOL: 0
SHORTNESS OF BREATH: 0
WHEEZING: 0

## 2022-06-02 ASSESSMENT — PATIENT HEALTH QUESTIONNAIRE - PHQ9
1. LITTLE INTEREST OR PLEASURE IN DOING THINGS: 0
2. FEELING DOWN, DEPRESSED OR HOPELESS: 0
SUM OF ALL RESPONSES TO PHQ QUESTIONS 1-9: 0
SUM OF ALL RESPONSES TO PHQ9 QUESTIONS 1 & 2: 0
SUM OF ALL RESPONSES TO PHQ QUESTIONS 1-9: 0

## 2022-06-02 NOTE — PROGRESS NOTES
[facility]  [facility]  [address] [facility]  Dept: [phone]  Dept Fax: [phone]  Loc: [phone]  PROGRESS NOTE      Visit Date: 6/2/2022    Joleen Castro is a 67 y.o. male who presents today for:  Chief Complaint   Patient presents with    6 Month Follow-Up    Gastroesophageal Reflux       Subjective:  Gastroesophageal Reflux  He reports no abdominal pain, no chest pain or no wheezing. 6 month f/u    Hypercholesterolemia: On lovastatin. No myalgias. LDL of 113 in nov 2021     GERD:  On nexium as needed. No abd pain or nausea. 19 lb wt loss since April 1st (Intentional). uses a wt loss tito. Tracks food intake    New problem:  Right shoulder pain x 2 weeks. No injury. Getting better. golfs. Review of Systems   Constitutional: Negative for chills and fever. Respiratory: Negative for shortness of breath and wheezing. Cardiovascular: Negative for chest pain. Gastrointestinal: Negative for abdominal pain and blood in stool. Genitourinary: Negative for dysuria and frequency. Musculoskeletal: Positive for arthralgias. Skin: Negative for rash and wound.      Patient Active Problem List   Diagnosis    History of cold sores    Hyperlipidemia    Glucose intolerance (impaired glucose tolerance)    Hematochezia    Constipation    Pre-diabetes    Renal cysts, acquired, bilateral    Primary osteoarthritis of left knee     Past Medical History:   Diagnosis Date    Arthritis     Erectile dysfunction     Glucose intolerance (impaired glucose tolerance)     Hyperlipidemia     Renal cyst, left 7-2001    Dr. Alma Rosa Swanson      Past Surgical History:   Procedure Laterality Date    COLONOSCOPY      TONSILLECTOMY      UPPER GASTROINTESTINAL ENDOSCOPY       Family History   Problem Relation Age of Onset    Cancer Mother     Alcohol Abuse Father     Cancer Sister     Diabetes Maternal Grandfather Social History     Tobacco Use    Smoking status: Never Smoker    Smokeless tobacco: Never Used   Substance Use Topics    Alcohol use: No      Current Outpatient Medications   Medication Sig Dispense Refill    esomeprazole (NEXIUM) 40 MG delayed release capsule Take 1 capsule by mouth every morning (before breakfast) 90 capsule 1    lovastatin (MEVACOR) 20 MG tablet Take 1 tablet by mouth daily 90 tablet 1    Naproxen Sodium (ALEVE) 220 MG CAPS Take by mouth      aspirin 81 MG tablet Take 81 mg by mouth daily       No current facility-administered medications for this visit. No Known Allergies  Health Maintenance   Topic Date Due    Annual Wellness Visit (AWV)  Never done    Hepatitis C screen  Never done    Depression Screen  08/13/2022    Lipids  11/29/2022    DTaP/Tdap/Td vaccine (2 - Td or Tdap) 04/05/2028    Flu vaccine  Completed    Shingles vaccine  Completed    Pneumococcal 65+ years Vaccine  Completed    COVID-19 Vaccine  Completed    Hepatitis A vaccine  Aged Out    Hepatitis B vaccine  Aged Out    Hib vaccine  Aged Out    Meningococcal (ACWY) vaccine  Aged Out       Objective:  /64 (Site: Right Upper Arm, Position: Sitting)   Pulse 74   Temp 97 °F (36.1 °C)   Resp 16   Ht 6' 2\" (1.88 m)   Wt 201 lb (91.2 kg)   SpO2 98%   BMI 25.81 kg/m²   Physical Exam  Vitals reviewed. Constitutional:       General: He is not in acute distress. Appearance: He is not ill-appearing. HENT:      Nose: Nose normal. No mucosal edema. Right Nostril: No epistaxis or septal hematoma. Left Nostril: No epistaxis or septal hematoma. Cardiovascular:      Rate and Rhythm: Normal rate and regular rhythm. Heart sounds: Murmur heard. Systolic murmur is present with a grade of 1/6. Pulmonary:      Effort: Pulmonary effort is normal. No respiratory distress. Breath sounds: No wheezing or rhonchi. Neurological:      Mental Status: He is alert.  Mental status is at baseline. Psychiatric:         Mood and Affect: Mood normal.         Behavior: Behavior normal.       Right shoulder:  Full ROM in abd, IR, ER.  5/5 IR, ER, abd. Positive condon. Negative empty can and full can. Lab Results   Component Value Date    WBC 5.4 09/28/2019    HGB 15.5 09/28/2019    HCT 45.6 09/28/2019     09/28/2019    CHOL 206 (H) 11/29/2021    TRIG 159 11/29/2021    HDL 61 11/29/2021    ALT 25 11/29/2021    AST 28 11/29/2021     11/29/2021    K 4.6 11/29/2021     11/29/2021    CREATININE 1.0 11/29/2021    BUN 28 (H) 11/29/2021    CO2 25 11/29/2021    TSH 2.40 09/27/2014    PSA 0.61 11/29/2021    LABA1C 5.8 11/29/2021         Impression/Plan:  1. Gastroesophageal reflux disease without esophagitis  Well-controlled. Chronic condition. Refill medication(s). - esomeprazole (NEXIUM) 40 MG delayed release capsule; Take 1 capsule by mouth every morning (before breakfast)  Dispense: 90 capsule; Refill: 1    2. Pure hypercholesterolemia  Chronic. Controlled. Refill med  - lovastatin (MEVACOR) 20 MG tablet; Take 1 tablet by mouth daily  Dispense: 90 tablet; Refill: 1    3. Acute pain of right shoulder  New problem of right shoulder pain. Improving. Likely impingement syndrome/tendinopathy. Avoid impingement positions. He declines home exercise program.  Golf as tolerated    Left knee pain is gradually returning. He will let us know when he wants to consider another steroid injection    congratulated patient on his weight loss    They voiced understanding. All questions answered. They agreed with treatment plan. See patient instructions for any educational materials that may have been given. Discussed use, benefit, and side effects of prescribed medications. Reviewed health maintenance.     (Please note that portions of this note may have been completed with a voice recognition program.  Efforts were made to edit the dictation but occasionally words are mis-transcribed.)    Return in about 6 months (around 12/2/2022) for physical.      Electronically signed by Santiago Taylor MD on 6/2/2022 at 9:24 AM No adenopathy or splenomegaly. No cervical or inguinal lymphadenopathy.

## 2022-07-25 ENCOUNTER — TELEPHONE (OUTPATIENT)
Dept: FAMILY MEDICINE CLINIC | Age: 77
End: 2022-07-25

## 2022-07-25 NOTE — TELEPHONE ENCOUNTER
Efe Sterling called regarding Nexium refill after checking informed Marcos script was ready at pharmacy.

## 2022-10-15 LAB
ALBUMIN SERPL-MCNC: 4.3 G/DL
ALP BLD-CCNC: 66 U/L
ALT SERPL-CCNC: 16 U/L
ANION GAP SERPL CALCULATED.3IONS-SCNC: 1.6 MMOL/L
AST SERPL-CCNC: 20 U/L
BASOPHILS ABSOLUTE: NORMAL
BASOPHILS RELATIVE PERCENT: 1.1 %
BILIRUB SERPL-MCNC: 1 MG/DL (ref 0.1–1.4)
BUN BLDV-MCNC: 21 MG/DL
CALCIUM SERPL-MCNC: 9.9 MG/DL
CHLORIDE BLD-SCNC: 104 MMOL/L
CHOLESTEROL, TOTAL: 189 MG/DL
CHOLESTEROL/HDL RATIO: 1.5
CO2: 29 MMOL/L
CREAT SERPL-MCNC: 0.9 MG/DL
EOSINOPHILS ABSOLUTE: 0.2 /ΜL
EOSINOPHILS RELATIVE PERCENT: 3.9 %
GFR CALCULATED: 82
GLUCOSE BLD-MCNC: 110 MG/DL
HCT VFR BLD CALC: 43.4 % (ref 41–53)
HDLC SERPL-MCNC: 70 MG/DL (ref 35–70)
HEMOGLOBIN: 14.9 G/DL (ref 13.5–17.5)
LDL CHOLESTEROL CALCULATED: 102 MG/DL (ref 0–160)
LYMPHOCYTES ABSOLUTE: 1.3 /ΜL
LYMPHOCYTES RELATIVE PERCENT: 28.8 %
MCH RBC QN AUTO: 33.1 PG
MCHC RBC AUTO-ENTMCNC: 34.3 G/DL
MCV RBC AUTO: 96.7 FL
MONOCYTES ABSOLUTE: 0.4 /ΜL
MONOCYTES RELATIVE PERCENT: 8.1 %
NEUTROPHILS ABSOLUTE: 2.6 /ΜL
NEUTROPHILS RELATIVE PERCENT: 58.1 %
NONHDLC SERPL-MCNC: NORMAL MG/DL
PDW BLD-RTO: 14 %
PHOSPHORUS: 3.4 MG/DL
PLATELET # BLD: 220 K/ΜL
PMV BLD AUTO: 6.7 FL
POTASSIUM SERPL-SCNC: 4.4 MMOL/L
PSA, ULTRASENSITIVE: 0.44
RBC # BLD: 4.49 10^6/ΜL
SODIUM BLD-SCNC: 140 MMOL/L
TOTAL PROTEIN: 7
TRIGL SERPL-MCNC: 84 MG/DL
URIC ACID: 5.8
VLDLC SERPL CALC-MCNC: 17 MG/DL
WBC # BLD: 4.5 10^3/ML

## 2022-11-07 ENCOUNTER — OFFICE VISIT (OUTPATIENT)
Dept: FAMILY MEDICINE CLINIC | Age: 77
End: 2022-11-07
Payer: COMMERCIAL

## 2022-11-07 VITALS
OXYGEN SATURATION: 98 % | SYSTOLIC BLOOD PRESSURE: 130 MMHG | HEART RATE: 63 BPM | WEIGHT: 200.4 LBS | HEIGHT: 74 IN | RESPIRATION RATE: 16 BRPM | DIASTOLIC BLOOD PRESSURE: 70 MMHG | BODY MASS INDEX: 25.72 KG/M2 | TEMPERATURE: 97.9 F

## 2022-11-07 DIAGNOSIS — Z00.00 WELL ADULT EXAM: Primary | ICD-10-CM

## 2022-11-07 DIAGNOSIS — E78.00 PURE HYPERCHOLESTEROLEMIA: ICD-10-CM

## 2022-11-07 DIAGNOSIS — M17.12 PRIMARY OSTEOARTHRITIS OF LEFT KNEE: ICD-10-CM

## 2022-11-07 PROCEDURE — G8484 FLU IMMUNIZE NO ADMIN: HCPCS | Performed by: FAMILY MEDICINE

## 2022-11-07 PROCEDURE — 99397 PER PM REEVAL EST PAT 65+ YR: CPT | Performed by: FAMILY MEDICINE

## 2022-11-07 PROCEDURE — 20605 DRAIN/INJ JOINT/BURSA W/O US: CPT | Performed by: FAMILY MEDICINE

## 2022-11-07 RX ORDER — TRIAMCINOLONE ACETONIDE 40 MG/ML
80 INJECTION, SUSPENSION INTRA-ARTICULAR; INTRAMUSCULAR ONCE
Status: COMPLETED | OUTPATIENT
Start: 2022-11-07 | End: 2022-11-07

## 2022-11-07 RX ORDER — LOVASTATIN 20 MG/1
20 TABLET ORAL DAILY
Qty: 90 TABLET | Refills: 3 | Status: SHIPPED | OUTPATIENT
Start: 2022-11-07

## 2022-11-07 RX ORDER — BUPIVACAINE HYDROCHLORIDE 5 MG/ML
4 INJECTION, SOLUTION PERINEURAL ONCE
Status: COMPLETED | OUTPATIENT
Start: 2022-11-07 | End: 2022-11-07

## 2022-11-07 RX ORDER — LIDOCAINE HYDROCHLORIDE 10 MG/ML
4 INJECTION, SOLUTION INFILTRATION; PERINEURAL ONCE
Status: COMPLETED | OUTPATIENT
Start: 2022-11-07 | End: 2022-11-07

## 2022-11-07 RX ORDER — ESOMEPRAZOLE MAGNESIUM 40 MG/1
40 CAPSULE, DELAYED RELEASE ORAL
Qty: 90 CAPSULE | Refills: 1 | Status: CANCELLED | OUTPATIENT
Start: 2022-11-07

## 2022-11-07 RX ADMIN — BUPIVACAINE HYDROCHLORIDE 20 MG: 5 INJECTION, SOLUTION PERINEURAL at 08:21

## 2022-11-07 RX ADMIN — LIDOCAINE HYDROCHLORIDE 4 ML: 10 INJECTION, SOLUTION INFILTRATION; PERINEURAL at 08:20

## 2022-11-07 RX ADMIN — TRIAMCINOLONE ACETONIDE 80 MG: 40 INJECTION, SUSPENSION INTRA-ARTICULAR; INTRAMUSCULAR at 08:18

## 2022-11-07 SDOH — ECONOMIC STABILITY: FOOD INSECURITY: WITHIN THE PAST 12 MONTHS, YOU WORRIED THAT YOUR FOOD WOULD RUN OUT BEFORE YOU GOT MONEY TO BUY MORE.: NEVER TRUE

## 2022-11-07 SDOH — ECONOMIC STABILITY: FOOD INSECURITY: WITHIN THE PAST 12 MONTHS, THE FOOD YOU BOUGHT JUST DIDN'T LAST AND YOU DIDN'T HAVE MONEY TO GET MORE.: NEVER TRUE

## 2022-11-07 ASSESSMENT — ENCOUNTER SYMPTOMS
PHOTOPHOBIA: 0
SHORTNESS OF BREATH: 0
ABDOMINAL PAIN: 0
NAUSEA: 0
SORE THROAT: 0
WHEEZING: 0
EYE DISCHARGE: 0

## 2022-11-07 ASSESSMENT — SOCIAL DETERMINANTS OF HEALTH (SDOH): HOW HARD IS IT FOR YOU TO PAY FOR THE VERY BASICS LIKE FOOD, HOUSING, MEDICAL CARE, AND HEATING?: NOT HARD AT ALL

## 2022-11-07 NOTE — PROGRESS NOTES
[facility]  [address] [address]  Dept: [phone]  Dept Fax: [phone]  Loc: [phone]  PROGRESS NOTE      Visit Date: 11/7/2022    Gregg Steen is a 67 y.o. male who presents today for:  Chief Complaint   Patient presents with    Annual Exam    Knee Pain     Left knee has been in pain recently       Subjective:  Knee Pain       Well adult exam    Exercise:  elliptical and golf  Diet:  nothing particular  Last Dentist appt:  upcoming  Last optometry appt:  this year  Sleep:  not good; 6-7 hours per day  Mood:  god  Other concerns:       Left knee pain. Worsening over 10-15 years. Medial knee pain. Xray 2 years ago. Popping and clicking. Steroid injection dec 2021. Takes aleve which helps. Does not give out. Uses knee sleeve. No locking. Hx of meniscus surgery years ago. Review of Systems   Constitutional:  Negative for fever and unexpected weight change. HENT:  Negative for ear pain and sore throat. Eyes:  Negative for photophobia and discharge. Respiratory:  Negative for shortness of breath and wheezing. Cardiovascular:  Negative for chest pain and leg swelling. Gastrointestinal:  Negative for abdominal pain and nausea. Endocrine: Negative for cold intolerance and heat intolerance. Genitourinary:  Negative for dysuria and frequency. Musculoskeletal:  Positive for arthralgias. Skin:  Negative for pallor and rash. Neurological:  Negative for syncope and light-headedness. Hematological:  Negative for adenopathy. Does not bruise/bleed easily. Psychiatric/Behavioral:  Negative for sleep disturbance. The patient is not nervous/anxious.     Patient Active Problem List   Diagnosis    History of cold sores    Hyperlipidemia    Glucose intolerance (impaired glucose tolerance)    Hematochezia    Constipation    Pre-diabetes    Renal cysts, acquired, bilateral    Primary osteoarthritis of left knee     Past Medical History:   Diagnosis Date    Arthritis     Erectile dysfunction     Glucose intolerance (impaired glucose tolerance)     Hyperlipidemia     Renal cyst, left 7-2001    Dr. Jayshree Rizo      Past Surgical History:   Procedure Laterality Date    COLONOSCOPY      TONSILLECTOMY      UPPER GASTROINTESTINAL ENDOSCOPY       Family History   Problem Relation Age of Onset    Cancer Mother     Alcohol Abuse Father     Cancer Sister     Diabetes Maternal Grandfather      Social History     Tobacco Use    Smoking status: Never    Smokeless tobacco: Never   Substance Use Topics    Alcohol use: No      Current Outpatient Medications   Medication Sig Dispense Refill    esomeprazole (NEXIUM) 40 MG delayed release capsule Take 1 capsule by mouth every morning (before breakfast) 90 capsule 1    lovastatin (MEVACOR) 20 MG tablet Take 1 tablet by mouth daily 90 tablet 1    Naproxen Sodium 220 MG CAPS Take by mouth      aspirin 81 MG tablet Take 81 mg by mouth daily       No current facility-administered medications for this visit. No Known Allergies  Health Maintenance   Topic Date Due    Hepatitis C screen  Never done    Annual Wellness Visit (AWV)  Never done    Depression Screen  06/02/2023    Lipids  10/15/2023    DTaP/Tdap/Td vaccine (2 - Td or Tdap) 04/05/2028    Flu vaccine  Completed    Shingles vaccine  Completed    Pneumococcal 65+ years Vaccine  Completed    COVID-19 Vaccine  Completed    Hepatitis A vaccine  Aged Out    Hib vaccine  Aged Out    Meningococcal (ACWY) vaccine  Aged Out       Objective:  /70   Pulse 63   Temp 97.9 °F (36.6 °C)   Resp 16   Ht 6' 2\" (1.88 m)   Wt 200 lb 6.4 oz (90.9 kg)   SpO2 98%   BMI 25.73 kg/m²   Physical Exam  Vitals reviewed. Constitutional:       General: He is not in acute distress. Appearance: He is well-developed. HENT:      Head: Normocephalic and atraumatic.       Right Ear: Tympanic membrane, ear canal and external ear normal.      Left Ear: Tympanic membrane, ear canal and external ear normal.      Ears:      Comments: Excessive cerumen bilaterally     Mouth/Throat:      Mouth: Mucous membranes are moist.      Pharynx: No oropharyngeal exudate. Eyes:      General: No scleral icterus. Right eye: No discharge. Left eye: No discharge. Cardiovascular:      Rate and Rhythm: Normal rate and regular rhythm. Heart sounds: Normal heart sounds. No murmur heard. No friction rub. No gallop. Pulmonary:      Effort: Pulmonary effort is normal. No respiratory distress. Breath sounds: Normal breath sounds. No wheezing or rhonchi. Abdominal:      General: There is no distension. Palpations: Abdomen is soft. There is no mass. Tenderness: There is no abdominal tenderness. There is no guarding or rebound. Musculoskeletal:      Right lower leg: No edema. Left lower leg: No edema. Skin:     General: Skin is warm. Findings: No erythema or rash. Neurological:      Mental Status: He is alert. Mental status is at baseline. Psychiatric:         Mood and Affect: Mood normal.         Behavior: Behavior normal.       Left knee: No effusion. Range of motion 0 to 120 degrees. No ecchymosis or erythema. Negative valgus and varus. Procedure Note left Knee Injection    Discussed risks and benefits of steroid injection, including but not limited to infection, skin discoloration, pain, and bleeding. With the patient's verbal informed consent, his left knee was prepped  in standard sterile fashion with Betadine and Alcohol. Ethyl chloride was used to anesthetize the skin. A mixture of 4 cc of 0.5% Bupivacaine, 4 cc of 1% Lidocaine,  and 2 cc of Kenalog 40 mg was injected into the left anterior lateral joint space. The patient tolerated this well without difficulty.   A band-aid was applied and the patient was advised to ice the knee for 15-20 minutes to relieve any injection site related pain.      Impression/Plan:  1. Well adult exam  Preventive health maintenance handout provided and discussed  Encouraged healthy diet and physical activity    2. Pure hypercholesterolemia  Chronic. Controlled. . Continue lovastatin  - lovastatin (MEVACOR) 20 MG tablet; Take 1 tablet by mouth daily  Dispense: 90 tablet; Refill: 3    3. Primary osteoarthritis of left knee  Left knee pain secondary to osteoarthritis. Recommend Tylenol and ice. Steroid injection was performed as described above. May need to consider when he might want a knee replacement  - MA ARTHROCENTESIS ASPIR&/INJ INTERM JT/BURS W/O US  - triamcinolone acetonide (KENALOG-40) injection 80 mg  - bupivacaine (MARCAINE) 0.5 % injection 20 mg  - lidocaine 1 % injection 4 mL      They voiced understanding. All questions answered. They agreed with treatment plan. See patient instructions for any educational materials that may have been given. Discussed use, benefit, and side effects of prescribed medications. Reviewed health maintenance. (Please note that portions of this note may have been completed with a voice recognition program.  Efforts were made to edit the dictation but occasionally words are mis-transcribed.)    Return in about 3 months (around 2/7/2023) for knee pain.       Electronically signed by Blair Rizo MD on 11/7/2022 at 7:54 AM

## 2023-02-08 ENCOUNTER — HOSPITAL ENCOUNTER (OUTPATIENT)
Dept: GENERAL RADIOLOGY | Age: 78
Discharge: HOME | End: 2023-02-08
Payer: COMMERCIAL

## 2023-02-08 ENCOUNTER — HOSPITAL ENCOUNTER (OUTPATIENT)
Age: 78
Discharge: HOME | End: 2023-02-08
Payer: COMMERCIAL

## 2023-02-08 ENCOUNTER — OFFICE VISIT (OUTPATIENT)
Dept: FAMILY MEDICINE CLINIC | Age: 78
End: 2023-02-08
Payer: COMMERCIAL

## 2023-02-08 VITALS
WEIGHT: 205.8 LBS | BODY MASS INDEX: 26.41 KG/M2 | OXYGEN SATURATION: 98 % | HEIGHT: 74 IN | DIASTOLIC BLOOD PRESSURE: 87 MMHG | RESPIRATION RATE: 14 BRPM | HEART RATE: 68 BPM | TEMPERATURE: 97.7 F | SYSTOLIC BLOOD PRESSURE: 134 MMHG

## 2023-02-08 DIAGNOSIS — M25.562 CHRONIC PAIN OF LEFT KNEE: ICD-10-CM

## 2023-02-08 DIAGNOSIS — R73.09 ELEVATED GLUCOSE: ICD-10-CM

## 2023-02-08 DIAGNOSIS — Z01.818 PRE-OP EVALUATION: ICD-10-CM

## 2023-02-08 DIAGNOSIS — M17.12 PRIMARY OSTEOARTHRITIS OF LEFT KNEE: Primary | ICD-10-CM

## 2023-02-08 DIAGNOSIS — G89.29 CHRONIC PAIN OF LEFT KNEE: ICD-10-CM

## 2023-02-08 LAB
ANION GAP SERPL CALC-SCNC: 10 MEQ/L (ref 8–16)
BACTERIA: ABNORMAL
BILIRUB UR QL STRIP: NEGATIVE
BUN SERPL-MCNC: 19 MG/DL (ref 7–22)
CALCIUM SERPL-MCNC: 9.6 MG/DL (ref 8.5–10.5)
CASTS #/AREA URNS LPF: ABNORMAL /LPF
CASTS #/AREA URNS LPF: ABNORMAL /LPF
CHARACTER UR: CLEAR
CHARCOAL URNS QL MICRO: ABNORMAL
CHLORIDE SERPL-SCNC: 99 MEQ/L (ref 98–111)
CO2 SERPL-SCNC: 29 MEQ/L (ref 23–33)
COLOR UR: YELLOW
CREAT SERPL-MCNC: 0.9 MG/DL (ref 0.4–1.2)
CRYSTALS URNS QL MICRO: ABNORMAL
DEPRECATED MEAN GLUCOSE BLD GHB EST-ACNC: 102 MG/DL (ref 70–126)
DEPRECATED RDW RBC AUTO: 48.6 FL (ref 35–45)
EPITHELIAL CELLS, UA: ABNORMAL /HPF
ERYTHROCYTE [DISTWIDTH] IN BLOOD BY AUTOMATED COUNT: 13.2 % (ref 11.5–14.5)
GFR SERPL CREATININE-BSD FRML MDRD: > 60 ML/MIN/1.73M2
GLUCOSE SERPL-MCNC: 99 MG/DL (ref 70–108)
GLUCOSE UR QL STRIP.AUTO: NEGATIVE MG/DL
HBA1C MFR BLD HPLC: 5.4 % (ref 4.4–6.4)
HCT VFR BLD AUTO: 46.6 % (ref 42–52)
HGB BLD-MCNC: 15.3 GM/DL (ref 14–18)
HGB UR QL STRIP.AUTO: NEGATIVE
KETONES UR QL STRIP.AUTO: NEGATIVE
LEUKOCYTE ESTERASE UR QL STRIP.AUTO: ABNORMAL
MCH RBC QN AUTO: 32.5 PG (ref 26–33)
MCHC RBC AUTO-ENTMCNC: 32.8 GM/DL (ref 32.2–35.5)
MCV RBC AUTO: 98.9 FL (ref 80–94)
NITRITE UR QL STRIP.AUTO: NEGATIVE
PH UR STRIP.AUTO: 7.5 [PH] (ref 5–9)
PLATELET # BLD AUTO: 199 THOU/MM3 (ref 130–400)
PMV BLD AUTO: 8.7 FL (ref 9.4–12.4)
POTASSIUM SERPL-SCNC: 4.8 MEQ/L (ref 3.5–5.2)
PROT UR STRIP.AUTO-MCNC: NEGATIVE MG/DL
RBC # BLD AUTO: 4.71 MILL/MM3 (ref 4.7–6.1)
RBC #/AREA URNS HPF: ABNORMAL /HPF
RENAL EPI CELLS #/AREA URNS HPF: ABNORMAL /[HPF]
SODIUM SERPL-SCNC: 138 MEQ/L (ref 135–145)
SP GR UR REFRACT.AUTO: 1.01 (ref 1–1.03)
UROBILINOGEN UR QL STRIP.AUTO: 1 EU/DL (ref 0–1)
WBC # BLD AUTO: 5.2 THOU/MM3 (ref 4.8–10.8)
WBC #/AREA URNS HPF: ABNORMAL /HPF
YEAST LIKE FUNGI URNS QL MICRO: ABNORMAL

## 2023-02-08 PROCEDURE — G8484 FLU IMMUNIZE NO ADMIN: HCPCS | Performed by: FAMILY MEDICINE

## 2023-02-08 PROCEDURE — G8417 CALC BMI ABV UP PARAM F/U: HCPCS | Performed by: FAMILY MEDICINE

## 2023-02-08 PROCEDURE — G8427 DOCREV CUR MEDS BY ELIG CLIN: HCPCS | Performed by: FAMILY MEDICINE

## 2023-02-08 PROCEDURE — 71046 X-RAY EXAM CHEST 2 VIEWS: CPT

## 2023-02-08 PROCEDURE — 36415 COLL VENOUS BLD VENIPUNCTURE: CPT

## 2023-02-08 PROCEDURE — 80048 BASIC METABOLIC PNL TOTAL CA: CPT

## 2023-02-08 PROCEDURE — 81001 URINALYSIS AUTO W/SCOPE: CPT

## 2023-02-08 PROCEDURE — 99214 OFFICE O/P EST MOD 30 MIN: CPT | Performed by: FAMILY MEDICINE

## 2023-02-08 PROCEDURE — 85027 COMPLETE CBC AUTOMATED: CPT

## 2023-02-08 PROCEDURE — 93000 ELECTROCARDIOGRAM COMPLETE: CPT | Performed by: FAMILY MEDICINE

## 2023-02-08 PROCEDURE — 83036 HEMOGLOBIN GLYCOSYLATED A1C: CPT

## 2023-02-08 PROCEDURE — 1036F TOBACCO NON-USER: CPT | Performed by: FAMILY MEDICINE

## 2023-02-08 PROCEDURE — 1123F ACP DISCUSS/DSCN MKR DOCD: CPT | Performed by: FAMILY MEDICINE

## 2023-02-08 SDOH — ECONOMIC STABILITY: INCOME INSECURITY: HOW HARD IS IT FOR YOU TO PAY FOR THE VERY BASICS LIKE FOOD, HOUSING, MEDICAL CARE, AND HEATING?: NOT HARD AT ALL

## 2023-02-08 SDOH — ECONOMIC STABILITY: HOUSING INSECURITY
IN THE LAST 12 MONTHS, WAS THERE A TIME WHEN YOU DID NOT HAVE A STEADY PLACE TO SLEEP OR SLEPT IN A SHELTER (INCLUDING NOW)?: NO

## 2023-02-08 SDOH — ECONOMIC STABILITY: FOOD INSECURITY: WITHIN THE PAST 12 MONTHS, THE FOOD YOU BOUGHT JUST DIDN'T LAST AND YOU DIDN'T HAVE MONEY TO GET MORE.: NEVER TRUE

## 2023-02-08 SDOH — ECONOMIC STABILITY: FOOD INSECURITY: WITHIN THE PAST 12 MONTHS, YOU WORRIED THAT YOUR FOOD WOULD RUN OUT BEFORE YOU GOT MONEY TO BUY MORE.: NEVER TRUE

## 2023-02-08 ASSESSMENT — PATIENT HEALTH QUESTIONNAIRE - PHQ9
SUM OF ALL RESPONSES TO PHQ QUESTIONS 1-9: 0
SUM OF ALL RESPONSES TO PHQ9 QUESTIONS 1 & 2: 0
2. FEELING DOWN, DEPRESSED OR HOPELESS: 0
1. LITTLE INTEREST OR PLEASURE IN DOING THINGS: 0
SUM OF ALL RESPONSES TO PHQ QUESTIONS 1-9: 0

## 2023-02-08 ASSESSMENT — ENCOUNTER SYMPTOMS
SHORTNESS OF BREATH: 0
WHEEZING: 0
BLOOD IN STOOL: 0

## 2023-02-08 NOTE — PROGRESS NOTES
[facility]  [address] [facility]  Dept: [phone]  Dept Fax: [phone]  Loc: [phone]  PROGRESS NOTE      Visit Date: 2/8/2023    Lj Markham is a 65 y.o. male who presents today for:  Chief Complaint   Patient presents with    Knee Pain     Left knee pain       Subjective:  Knee Pain       Pre-op appt  Left knee pain. S/p steroid injection 3 months ago. Scheduled for TKA on 2/27/23 at [facility] Dr. Say Romero. Stress test in 2016. No previous heart cath. No stents    Shoveling dirt into wheelbarrow and moving it at home yesterday. No chest pain or SOB with this. Able to walk up flight of steps without SOB or chest pain    No bleeding disorders  No hx of DVT or PE. Just returned from [facility]. Review of Systems   Constitutional:  Negative for chills and fever. Respiratory:  Negative for shortness of breath and wheezing. Cardiovascular:  Negative for chest pain, palpitations and leg swelling. Gastrointestinal:  Negative for blood in stool. Musculoskeletal:  Positive for arthralgias. Negative for neck pain. Neurological:  Negative for dizziness, syncope and light-headedness. Hematological:  Does not bruise/bleed easily.    Patient Active Problem List   Diagnosis    History of cold sores    Hyperlipidemia    Glucose intolerance (impaired glucose tolerance)    Hematochezia    Constipation    Pre-diabetes    Renal cysts, acquired, bilateral    Primary osteoarthritis of left knee     Past Medical History:   Diagnosis Date    Arthritis     Erectile dysfunction     Glucose intolerance (impaired glucose tolerance)     Hyperlipidemia     Renal cyst, left 7-2001    Dr. Aline Rockwell      Past Surgical History:   Procedure Laterality Date    COLONOSCOPY      KNEE ARTHROSCOPY Left     TONSILLECTOMY      UPPER GASTROINTESTINAL ENDOSCOPY       Family History   Problem Relation Age of Onset    Cancer Mother     Alcohol Abuse Father     Cancer Sister     Diabetes Maternal Grandfather      Social History     Tobacco Use    Smoking status: Never    Smokeless tobacco: Never   Substance Use Topics    Alcohol use: No      Current Outpatient Medications   Medication Sig Dispense Refill    lovastatin (MEVACOR) 20 MG tablet Take 1 tablet by mouth daily 90 tablet 3    esomeprazole (NEXIUM) 40 MG delayed release capsule Take 1 capsule by mouth every morning (before breakfast) 90 capsule 1    Naproxen Sodium 220 MG CAPS Take by mouth      aspirin 81 MG tablet Take 81 mg by mouth daily       No current facility-administered medications for this visit. No Known Allergies  Health Maintenance   Topic Date Due    Hepatitis C screen  Never done    Annual Wellness Visit (AWV)  Never done    Depression Screen  06/02/2023    Lipids  10/15/2023    DTaP/Tdap/Td vaccine (2 - Td or Tdap) 04/05/2028    Flu vaccine  Completed    Shingles vaccine  Completed    Pneumococcal 65+ years Vaccine  Completed    COVID-19 Vaccine  Completed    Hepatitis A vaccine  Aged Out    Hib vaccine  Aged Out    Meningococcal (ACWY) vaccine  Aged Out       Objective:  /87   Pulse 68   Temp 97.7 °F (36.5 °C)   Resp 14   Ht 6' 2\" (1.88 m)   Wt 205 lb 12.8 oz (93.4 kg)   SpO2 98%   BMI 26.42 kg/m²   Physical Exam  Vitals reviewed. Constitutional:       General: He is not in acute distress. Appearance: He is well-developed. HENT:      Head: Normocephalic and atraumatic. Right Ear: Ear canal and external ear normal.      Left Ear: Ear canal and external ear normal.      Ears:      Comments: Excessive cerumen bilaterally     Mouth/Throat:      Mouth: Mucous membranes are moist.      Pharynx: No oropharyngeal exudate. Eyes:      General: No scleral icterus. Right eye: No discharge. Left eye: No discharge. Cardiovascular:      Rate and Rhythm: Normal rate and regular rhythm. Heart sounds: Normal heart sounds.  No murmur heard. No friction rub. No gallop. Pulmonary:      Effort: Pulmonary effort is normal. No respiratory distress. Breath sounds: Normal breath sounds. No wheezing or rhonchi. Abdominal:      General: There is no distension. Palpations: Abdomen is soft. There is no mass. Tenderness: There is no abdominal tenderness. There is no guarding or rebound. Musculoskeletal:      Right lower leg: No edema. Left lower leg: No edema. Skin:     General: Skin is warm. Findings: No erythema or rash. Neurological:      Mental Status: He is alert. Mental status is at baseline. Psychiatric:         Mood and Affect: Mood normal.         Behavior: Behavior normal.       Impression/Plan:  1. Primary osteoarthritis of left knee  Preop visit for left knee TKA to be formed performed 2/27 by Dr. Abbi Roland)  Able to perform greater than 4 METS of physical activity without symptoms, may proceed with surgery without further cardiac risk stratification. I called down to the surgeon's office to obtain paperwork regarding the surgery and a preop testing they require. No one answered    2. Chronic pain of left knee    3. Pre-op evaluation  Preop testing. EKG was performed and interpreted separately  - EKG 12 Lead - Clinic Performed; Future  - CBC; Future  - Basic Metabolic Panel; Future  - Urinalysis; Future  - XR CHEST STANDARD (2 VW); Future  - EKG 12 Lead - Clinic Performed    4. Elevated glucose  History of prediabetes.   Check A1c  - Hemoglobin A1C; Future    Pre-Operative Risk assessment using 2014 ACC/AHA guidelines     Emergent procedure No  Active Cardiac Condition No (decompensated HF, Arrhythmia, MI <3 weeks, severe valve disease)  Risk Level of Procedure Intermediate Risk (intraperitoneal, intrathoracic, HENT, orthopedic, or carotid endarterectomy, etc.)  Revised Cardiac Risk Index Risk factors: None  Measurement of Exercise Tolerance before Surgery >4 Yes    According to the 2014 ACC/AHA pre-operative risk assessment guidelines Kanu Amend is a low risk for major cardiac complications during a intermediate risk procedure and may continue as planned. Specific medication recommendations are listed below. Medications recommended to continue should be taken with a sip of water even when NPO. Further recommendations from consultants: None    Medication Recommendations:  Hold aspirin and Naprosyn preoperatively per surgeon protocol. They voiced understanding. All questions answered. They agreed with treatment plan. See patient instructions for any educational materials that may have been given. Discussed use, benefit, and side effects of prescribed medications. Reviewed health maintenance. (Please note that portions of this note may have been completed with a voice recognition program.  Efforts were made to edit the dictation but occasionally words are mis-transcribed.)    Return in about 3 months (around 5/8/2023) for cholesterol.       Electronically signed by Yulisa German MD on 2/8/2023 at 7:49 AM

## 2023-02-09 ENCOUNTER — HOSPITAL ENCOUNTER (OUTPATIENT)
Age: 78
Discharge: HOME | End: 2023-02-09
Payer: MEDICARE

## 2023-02-09 ENCOUNTER — TELEPHONE (OUTPATIENT)
Dept: FAMILY MEDICINE CLINIC | Age: 78
End: 2023-02-09

## 2023-02-09 DIAGNOSIS — M17.12 PRIMARY OSTEOARTHRITIS OF LEFT KNEE: ICD-10-CM

## 2023-02-09 DIAGNOSIS — M25.69 STIFFNESS OF OTHER SPECIFIED JOINT, NOT ELSEWHERE CLASSIFIED: ICD-10-CM

## 2023-02-09 DIAGNOSIS — Z01.818 PRE-OP EVALUATION: ICD-10-CM

## 2023-02-09 DIAGNOSIS — Z01.818 PRE-OP EVALUATION: Primary | ICD-10-CM

## 2023-02-09 DIAGNOSIS — M19.09 PRIMARY OSTEOARTHRITIS, OTHER SPECIFIED SITE: ICD-10-CM

## 2023-02-09 LAB
APTT PPP: 36.3 SECONDS (ref 22–38)
INR PPP: 1 (ref 0.85–1.13)

## 2023-02-09 PROCEDURE — 85610 PROTHROMBIN TIME: CPT

## 2023-02-09 PROCEDURE — 85730 THROMBOPLASTIN TIME PARTIAL: CPT

## 2023-02-09 PROCEDURE — 36415 COLL VENOUS BLD VENIPUNCTURE: CPT

## 2023-02-09 NOTE — TELEPHONE ENCOUNTER
Patient informed and verbalized understanding. Patient reports will get the lab work drawn at [facility].

## 2023-02-09 NOTE — TELEPHONE ENCOUNTER
Received fax from [facility] for preop testing that is required. Needs to obtain the following blood work: INR and PTT    Please advise patient.   Santos Blair MD

## 2023-03-06 ENCOUNTER — TELEPHONE (OUTPATIENT)
Dept: FAMILY MEDICINE CLINIC | Age: 78
End: 2023-03-06

## 2023-03-06 NOTE — TELEPHONE ENCOUNTER
This on-call physician received a call from patient at about [phone] this morning stating the hospital needed an H&P to proceed with his surgery as he is currently seen at [facility].  He then handed the phone to his nurse who I spoke with. The nurse stated they did not have an H&P. I asked them to look on care everywhere through [facility] and they were able to find an H&P so that the patient could proceed with surgery.

## 2023-03-29 ENCOUNTER — HOSPITAL ENCOUNTER (OUTPATIENT)
Dept: PHYSICAL THERAPY | Age: 78
Setting detail: THERAPIES SERIES
Discharge: HOME | End: 2023-03-29
Payer: COMMERCIAL

## 2023-03-29 PROCEDURE — 97110 THERAPEUTIC EXERCISES: CPT

## 2023-03-29 PROCEDURE — 97162 PT EVAL MOD COMPLEX 30 MIN: CPT

## 2023-03-29 ASSESSMENT — KOOS JR
STRAIGHTENING KNEE FULLY: 2
HOW SEVERE IS YOUR KNEE STIFFNESS AFTER FIRST WAKING IN MORNING: 2
GOING UP OR DOWN STAIRS: 2
KOOS JR TOTAL INTERVAL SCORE: 52.465
BENDING TO THE FLOOR TO PICK UP OBJECT: 2
RISING FROM SITTING: 2
STANDING UPRIGHT: 2
TWISING OR PIVOTING ON KNEE: 2

## 2023-03-29 NOTE — PROGRESS NOTES
to light golfing as OK per MD  I with HEP as prescribed to allow patient to return to yard work , mowing x 30 minutes with no difficulty    Patient Education:   [x]  HEP/Education Completed: Plan of Care, Goals, HEP of above exercises  Medbridge Access Code:  []  No new Education completed  []  Reviewed Prior HEP      []  Patient verbalized and/or demonstrated understanding of education provided. []  Patient unable to verbalize and/or demonstrate understanding of education provided. Will continue education. [x]  Barriers to learning: none    PLAN:  Treatment Recommendations: Strengthening, Range of Motion, Balance Training, Gait Training, Stair Training, Home Exercise Program, and Patient Education    [x]  Plan of care initiated. Plan to see patient 3 times per week for 6 weeks to address the treatment planned outlined above.   []  Continue with current plan of care  []  Modify plan of care as follows:    []  Hold pending physician visit  []  Discharge    Time In 1000   Time Out 1055   Timed Code Minutes: 25 min   Total Treatment Time: 50 min       Electronically Signed by: Juli Gil, PT

## 2023-03-31 ENCOUNTER — HOSPITAL ENCOUNTER (OUTPATIENT)
Dept: PHYSICAL THERAPY | Age: 78
Setting detail: THERAPIES SERIES
Discharge: HOME | End: 2023-03-31
Payer: COMMERCIAL

## 2023-03-31 PROCEDURE — 97110 THERAPEUTIC EXERCISES: CPT

## 2023-03-31 NOTE — PROGRESS NOTES
Patient unable to verbalize and/or demonstrate understanding of education provided. Will continue education. [x]  Barriers to learning: none    PLAN:  Treatment Recommendations: Strengthening, Range of Motion, Balance Training, Gait Training, Stair Training, Home Exercise Program, and Patient Education    []  Plan of care initiated. Plan to see patient 3 times per week for 6 weeks to address the treatment planned outlined above.   [x]  Continue with current plan of care  []  Modify plan of care as follows:    []  Hold pending physician visit  []  Discharge    Time In 800   Time Out 830   Timed Code Minutes: 30 min   Total Treatment Time: 30 min       Electronically Signed by: Willard Pereira PT

## 2023-04-03 ENCOUNTER — HOSPITAL ENCOUNTER (OUTPATIENT)
Dept: PHYSICAL THERAPY | Age: 78
Setting detail: THERAPIES SERIES
Discharge: HOME | End: 2023-04-03
Payer: COMMERCIAL

## 2023-04-03 PROCEDURE — 97110 THERAPEUTIC EXERCISES: CPT

## 2023-04-03 NOTE — PROGRESS NOTES
exercise/intervention indicates progression   Modalities Parameters/  Location  Notes                     Manual Therapy Time/Technique  Notes                     Exercise/Intervention   Notes   Bike seat 7 5 minutes  x    Heel slide with strap vended 15x 5s x    Quad set ankle on bolster 15x 5s x AROM L knee 3- 109 degrees   SLR emphasis on knee straight 15x 5s x           Sitting EOB knee bent, R crossed to push 15x 5s x    LAQ 15x 5s x           Total gym level 8  20x  x                                  Specific Interventions Next Treatment: Bike, L TKR exercises, progress to standing, step up, tband. No nautilus     Activity/Treatment Tolerance:  [x]  Patient tolerated treatment well  []  Patient limited by fatigue  []  Patient limited by pain   []  Patient limited by medical complications  []  Other:     Assessment: Pt tolerated session well, proper technique noted with exs, did give verbal cueing for quad set on bolster. Improved AROM and strength noted. Pt to continue with HEP.     GOALS:  Patient Goal: to get back to playing golf    Short Term Goals:  Time Frame: 4 weeks  Increase AROM L knee 0- 110 degrees to allow patient to report able to walk x 15 minutes outside with decreased pain to 2/10  Increase strength L LE to 4/5 to allow patient to report able to go up and down steps reciprocally   I with HEP as prescribed to improve Tinetti balance to 24/28 to improve stability working in yard x 10 minutes with no LOB    Long Term Goals:  Time Frame: 6 weeks  Increase AROM L knee 0- 120 degrees to allow patient to report able to exercise on bike and elliptical x 30 minutes  Increase strength L LE to 4+/5 to allow patient to report able to return to light golfing as OK per MD  I with HEP as prescribed to allow patient to return to yard work , mowing x 30 minutes with no difficulty    Patient Education:   []  HEP/Education Completed: Plan of Care, Goals, HEP of above exercises  [employer] Access Code:  []  No new

## 2023-04-05 ENCOUNTER — HOSPITAL ENCOUNTER (OUTPATIENT)
Dept: PHYSICAL THERAPY | Age: 78
Setting detail: THERAPIES SERIES
Discharge: HOME | End: 2023-04-05
Payer: COMMERCIAL

## 2023-04-05 PROCEDURE — 97110 THERAPEUTIC EXERCISES: CPT

## 2023-04-05 NOTE — PROGRESS NOTES
Education:   []  HEP/Education Completed: Plan of Care, Goals, HEP of above exercises  [employer] Access Code:  []  No new Education completed  [x]  Reviewed Prior HEP      []  Patient verbalized and/or demonstrated understanding of education provided. []  Patient unable to verbalize and/or demonstrate understanding of education provided. Will continue education. [x]  Barriers to learning: none    PLAN:  Treatment Recommendations: Strengthening, Range of Motion, Balance Training, Gait Training, Stair Training, Home Exercise Program, and Patient Education    []  Plan of care initiated. Plan to see patient 3 times per week for 6 weeks to address the treatment planned outlined above.   [x]  Continue with current plan of care  []  Modify plan of care as follows:    []  Hold pending physician visit  []  Discharge    Time In 1035   Time Out 1100   Timed Code Minutes: 25 min   Total Treatment Time: 25 min       Electronically Signed by: Jeff Cosby PTA

## 2023-04-07 ENCOUNTER — HOSPITAL ENCOUNTER (OUTPATIENT)
Dept: PHYSICAL THERAPY | Age: 78
Setting detail: THERAPIES SERIES
Discharge: HOME | End: 2023-04-07
Payer: COMMERCIAL

## 2023-04-07 PROCEDURE — 97110 THERAPEUTIC EXERCISES: CPT

## 2023-04-07 NOTE — PROGRESS NOTES
Education completed  [x]  Reviewed Prior HEP      []  Patient verbalized and/or demonstrated understanding of education provided. []  Patient unable to verbalize and/or demonstrate understanding of education provided. Will continue education. [x]  Barriers to learning: none    PLAN:  Treatment Recommendations: Strengthening, Range of Motion, Balance Training, Gait Training, Stair Training, Home Exercise Program, and Patient Education    []  Plan of care initiated.   Plan to see patient 3 times per week for 6 weeks to address the treatment planned outlined above  [x]  Continue with current plan of care  []  Modify plan of care as follows:    []  Hold pending physician visit  []  Discharge    Time In 800   Time Out 840   Timed Code Minutes: 40 min   Total Treatment Time: 40 min       Electronically Signed by: Lilliam Monson PT

## 2023-04-09 PROBLEM — R55 SYNCOPE AND COLLAPSE: Status: ACTIVE | Noted: 2023-04-09

## 2023-04-10 ENCOUNTER — HOSPITAL ENCOUNTER (OUTPATIENT)
Dept: PHYSICAL THERAPY | Age: 78
Setting detail: THERAPIES SERIES
End: 2023-04-10
Payer: COMMERCIAL

## 2023-04-14 ENCOUNTER — APPOINTMENT (OUTPATIENT)
Dept: PHYSICAL THERAPY | Age: 78
End: 2023-04-14
Payer: COMMERCIAL

## 2023-04-17 ENCOUNTER — HOSPITAL ENCOUNTER (OUTPATIENT)
Dept: PHYSICAL THERAPY | Age: 78
Setting detail: THERAPIES SERIES
Discharge: HOME | End: 2023-04-17
Payer: COMMERCIAL

## 2023-04-17 PROCEDURE — 97530 THERAPEUTIC ACTIVITIES: CPT

## 2023-04-17 PROCEDURE — 97110 THERAPEUTIC EXERCISES: CPT

## 2023-04-17 NOTE — PROGRESS NOTES
[address] [facility]  PHYSICAL THERAPY  [] EVALUATION  [x] DAILY NOTE (LAND) [] DAILY NOTE (AQUATIC ) [] PROGRESS NOTE [] DISCHARGE NOTE    [] [address] [facility]   [x] [address]    [] [facility]   [] Yunior Vasquez    Date: 2023  Patient Name:  Jaquelin Edwards  : 1945  MRN: [id number]  CSN: [id number]    Referring Practitioner Gary Jacinto,    Diagnosis L TKR,  M17.12    Treatment Diagnosis L knee pain, difficulty walking decreased balance   Date of Evaluation 3/29/23   Additional Pertinent History High cholesterol      Functional Outcome Measure Used KOOS JR   Functional Outcome Score Eval score 14 (3/29/23)       Insurance: Primary: Payor: [employer] /  /  / ,   Secondary:    Authorization Information: INSURANCE PAYS AT:   80%              PATIENT RESPONSIBILITY AND/OR CO-PAY:  20%  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       PRECERTIFICATION REQUIRED:  No  INSURANCE THERAPY BENEFIT:  Patient has unlimited visits based on medical necessity  Benefit will not cover maintenance or preventative treatment. AQUATIC THERAPY COVERED:   Yes  MODALITIES COVERED:  Yes, with the exception of iontophoresis and hot packs/cold packs   Visit # 6, 6/10 for progress note   Visits Allowed: unlimited   Recertification Date:    Physician Follow-Up: 5 weeks, unsure of date   Physician Orders:    History of Present Illness: L knee pain x 30 years. Had knee scope 30+ years ago. Did 2 cortisone injections Fall  with family MD.  Freddy Jean for consult with Dr. Katie Sands in [facility] 2023.   3/6/23 L TKR with Home health PT until f/u with MD on 3/22 and OP PT ordered. SUBJECTIVE: Pt states pain in knee 4/10, mostly swelling. States compliant with HEP, riding stationary bike 20 min 2x a day and some elliptical once a day.   States no pain with either       OBJECTIVE:    TREATMENT   Precautions: none   Pain: L knee pain 4/10

## 2023-04-19 ENCOUNTER — OFFICE VISIT (OUTPATIENT)
Dept: FAMILY MEDICINE CLINIC | Age: 78
End: 2023-04-19

## 2023-04-19 ENCOUNTER — HOSPITAL ENCOUNTER (OUTPATIENT)
Dept: PHYSICAL THERAPY | Age: 78
Setting detail: THERAPIES SERIES
Discharge: HOME | End: 2023-04-19
Payer: COMMERCIAL

## 2023-04-19 ENCOUNTER — APPOINTMENT (OUTPATIENT)
Dept: PHYSICAL THERAPY | Age: 78
End: 2023-04-19
Payer: COMMERCIAL

## 2023-04-19 VITALS
TEMPERATURE: 97.3 F | SYSTOLIC BLOOD PRESSURE: 132 MMHG | HEIGHT: 72 IN | WEIGHT: 204.8 LBS | DIASTOLIC BLOOD PRESSURE: 88 MMHG | BODY MASS INDEX: 27.74 KG/M2 | RESPIRATION RATE: 16 BRPM | HEART RATE: 79 BPM | OXYGEN SATURATION: 99 %

## 2023-04-19 DIAGNOSIS — D50.9 IRON DEFICIENCY ANEMIA, UNSPECIFIED IRON DEFICIENCY ANEMIA TYPE: ICD-10-CM

## 2023-04-19 DIAGNOSIS — R55 SYNCOPE AND COLLAPSE: Primary | ICD-10-CM

## 2023-04-19 DIAGNOSIS — S02.85XA CLOSED FRACTURE OF LEFT ORBIT, INITIAL ENCOUNTER (HCC): ICD-10-CM

## 2023-04-19 DIAGNOSIS — S02.401A CLOSED FRACTURE OF MAXILLARY SINUS, INITIAL ENCOUNTER (HCC): ICD-10-CM

## 2023-04-19 DIAGNOSIS — K29.60 EROSIVE GASTRITIS: ICD-10-CM

## 2023-04-19 DIAGNOSIS — Z09 HOSPITAL DISCHARGE FOLLOW-UP: ICD-10-CM

## 2023-04-19 PROCEDURE — 97110 THERAPEUTIC EXERCISES: CPT

## 2023-04-19 ASSESSMENT — ENCOUNTER SYMPTOMS
WHEEZING: 0
SHORTNESS OF BREATH: 0

## 2023-04-19 NOTE — PROGRESS NOTES
weeks  Increase AROM L knee 0- 120 degrees to allow patient to report able to exercise on bike and elliptical x 30 minutes  Increase strength L LE to 4+/5 to allow patient to report able to return to light golfing as OK per MD  I with HEP as prescribed to allow patient to return to yard work , mowing x 30 minutes with no difficulty    Patient Education:   []  HEP/Education Completed: Plan of Care, Goals, HEP of above exercises  [facility]:  []  No new Education completed  [x]  Reviewed Prior HEP      []  Patient verbalized and/or demonstrated understanding of education provided. []  Patient unable to verbalize and/or demonstrate understanding of education provided. Will continue education. [x]  Barriers to learning: none    PLAN:  Treatment Recommendations: Strengthening, Range of Motion, Balance Training, Gait Training, Stair Training, Home Exercise Program, and Patient Education    []  Plan of care initiated.   Plan to see patient 3 times per week for 6 weeks to address the treatment planned outlined above  [x]  Continue with current plan of care  []  Modify plan of care as follows:    []  Hold pending physician visit  []  Discharge    Time In 0800   Time Out 0833   Timed Code Minutes: 33 min   Total Treatment Time: 33 min       Electronically Signed by: Fabrice Christine PTA

## 2023-04-19 NOTE — PROGRESS NOTES
Post-Discharge Transitional Care Follow Up      Edwar Tsai   YOB: 1945    Date of Office Visit:  4/19/2023  Date of Hospital Admission: 4/9/23  Date of Hospital Discharge: 4/11/23  Readmission Risk Score (high >=14%. Medium >=10%):No data recorded    Care management risk score Rising risk (score 2-5) and Complex Care (Scores >=6): No Risk Score On File     Non face to face  following discharge, date last encounter closed (first attempt may have been earlier): 04/13/2023     Call initiated 2 business days of discharge: Yes     Syncope and collapse  Closed fracture of left orbit, initial encounter [facility])  -     External Referral To ENT  -     External Referral To Ophthalmology  Closed fracture of maxillary sinus, initial encounter ([facility] [address]  -     External Referral To ENT  Erosive gastritis  Iron deficiency anemia, unspecified iron deficiency anemia type  Hospital discharge follow-up  -     IL DISCHARGE MEDS RECONCILED W/ CURRENT OUTPATIENT MED LIST      Medical Decision Making: moderate complexity  Return if symptoms worsen or fail to improve. Referral to [facility] ophthalmology and ENT given the left sinus fracture and left orbital fracture    Erosive gastritis: Status post EGD. Continue Nexium. Follow-up with GI in may    Anemia: Consider iron supplement. Subjective:   HPI    Inpatient course: Discharge summary reviewed- see chart. Interval history/Current status:     Hospital admission status post syncope secondary to anemia from erosive gastritis and recent knee replacement,  with facial fractures. No further syncope. No vision issues. Walking is improving.   In PT for knee        May17th with Kenyetta    Needs appt for facial fx    Patient Active Problem List   Diagnosis    History of cold sores    Hyperlipidemia    Glucose intolerance (impaired glucose tolerance)    Hematochezia    Constipation    Pre-diabetes    Renal cysts, acquired, bilateral    Primary

## 2023-04-21 ENCOUNTER — APPOINTMENT (OUTPATIENT)
Dept: PHYSICAL THERAPY | Age: 78
End: 2023-04-21
Payer: COMMERCIAL

## 2023-04-24 ENCOUNTER — HOSPITAL ENCOUNTER (OUTPATIENT)
Dept: PHYSICAL THERAPY | Age: 78
Setting detail: THERAPIES SERIES
Discharge: HOME | End: 2023-04-24
Payer: COMMERCIAL

## 2023-04-24 PROCEDURE — 97110 THERAPEUTIC EXERCISES: CPT

## 2023-04-24 NOTE — PROGRESS NOTES
weeks  Increase AROM L knee 0- 120 degrees to allow patient to report able to exercise on bike and elliptical x 30 minutes  Increase strength L LE to 4+/5 to allow patient to report able to return to light golfing as OK per MD  I with HEP as prescribed to allow patient to return to yard work , mowing x 30 minutes with no difficulty    REVISED GOALS:  SHORT TERM GOALS:  DEFERRED TO LTG'S  LONG TERM GOALS:  4 WEEKS  Long Term Goals:  Time Frame: 6 weeks  Increase AROM L knee 0- 120 degrees to allow patient to report able to exercise on bike and elliptical x 30 minutes. NOT MET- CONTINUE  Increase strength L LE to 4+/5 to allow patient to report able to return to light golfing as OK per MD.  NOT MET- CONTINUE  I with HEP as prescribed to allow patient to return to yard work , mowing x 30 minutes with no difficulty  NOT MET CONTINUE    Patient Education:   [x]  HEP/Education Completed: HANDOUT for step stretch  []  No new Education completed  [x]  Reviewed Prior HEP      []  Patient verbalized and/or demonstrated understanding of education provided. []  Patient unable to verbalize and/or demonstrate understanding of education provided. Will continue education. [x]  Barriers to learning: none    PLAN:  Treatment Recommendations: Strengthening, Range of Motion, Balance Training, Gait Training, Stair Training, Home Exercise Program, and Patient Education    []  Plan of care initiated.   Plan to see patient 3 times per week for 4 weeks to address the treatment planned outlined above  [x]  Continue with current plan of care  []  Modify plan of care as follows:    []  Hold pending physician visit  []  Discharge    Time In 0800   Time Out 0835   Timed Code Minutes: 35 min   Total Treatment Time: 35 min       Electronically Signed by: Jeffrey Braden, PT

## 2023-04-26 ENCOUNTER — HOSPITAL ENCOUNTER (OUTPATIENT)
Dept: PHYSICAL THERAPY | Age: 78
Setting detail: THERAPIES SERIES
Discharge: HOME | End: 2023-04-26
Payer: COMMERCIAL

## 2023-04-26 PROCEDURE — 97110 THERAPEUTIC EXERCISES: CPT

## 2023-04-26 NOTE — PROGRESS NOTES
[address] [facility]  PHYSICAL THERAPY  [] EVALUATION  [x] DAILY NOTE (LAND) [] DAILY NOTE (AQUATIC ) [] PROGRESS NOTE [] DISCHARGE NOTE    [] [address] [facility]   [x] [address]    [] [facility]   [] Juan Carlos Anton    Date: 2023  Patient Name:  Jodie Edwards  : 1945  MRN: [id number]  CSN: [id number]    Referring Practitioner Marilyn Pacheco DO   Diagnosis L TKR,  M17.12    Treatment Diagnosis L knee pain, difficulty walking decreased balance   Date of Evaluation 3/29/23   Additional Pertinent History High cholesterol      Functional Outcome Measure Used KOOS JR   Functional Outcome Score Eval score 14 (3/29/23)       Insurance: Primary: Payor: [employer] /  /  / ,   Secondary:    Authorization Information: INSURANCE PAYS AT:   80%              PATIENT RESPONSIBILITY AND/OR CO-PAY:  20%  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       PRECERTIFICATION REQUIRED:  No  INSURANCE THERAPY BENEFIT:  Patient has unlimited visits based on medical necessity  Benefit will not cover maintenance or preventative treatment. AQUATIC THERAPY COVERED:   Yes  MODALITIES COVERED:  Yes, with the exception of iontophoresis and hot packs/cold packs   Visit # 8, 110 for progress note   Visits Allowed: unlimited   Recertification Date:    Physician Follow-Up: 5 weeks, unsure of date   Physician Orders:    History of Present Illness: L knee pain x 30 years. Had knee scope 30+ years ago. Did 2 cortisone injections Fall  with family MD.  Kaylin Heath for consult with Dr. Kennieth Goldmann in [facility] 2023.   3/6/23 L TKR with Home health PT until f/u with MD on 3/22 and OP PT ordered. SUBJECTIVE: Pt reports he is always amazed at how much he knee stiffens after driving. States he did some yard work yesterday and could feel some soreness. States no pain today, only stiffness.     OBJECTIVE:    TREATMENT   Precautions: none   Pain: Denies

## 2023-04-28 ENCOUNTER — HOSPITAL ENCOUNTER (OUTPATIENT)
Dept: PHYSICAL THERAPY | Age: 78
Setting detail: THERAPIES SERIES
Discharge: HOME | End: 2023-04-28
Payer: COMMERCIAL

## 2023-04-28 PROCEDURE — 97110 THERAPEUTIC EXERCISES: CPT

## 2023-04-28 NOTE — PROGRESS NOTES
*\" next to exercise/intervention indicates progression   Modalities Parameters/  Location  Notes                     Manual Therapy Time/Technique  Notes                     Exercise/Intervention   Notes   Bike seat 6 5 minutes  x Retro and fwd   Step stretch knee flex  10x 5s x           Heel slide with strap vended 20x 5s x    Heel slides without strap 10x      Quad set ankle on bolster 20x 5s x AROM L knee 0- 112 degrees, AAROM knee flex 112 degree   SLR emphasis on knee straight 15x 5s x           Sitting EOB knee bent, R crossed to push 15x 5s x    LAQ 15x 5s x    Seated HS stretch  3x 10s   HEP HO given          Step up 4 inch forward/lateral 15x  x Verbal cueing required for proper form   Retro step-up 2 inch 10x      Standing B heel raises  15x  x    Standing small knee bent 15x   Noticed lateral hip shift to R,completed infront of mirror. Total gym level 8  30x  x    Standing marching* 10  x                           Specific Interventions Next Treatment: Bike, L TKR exercises, progress to standing, step up, tband. No nautilus     Activity/Treatment Tolerance:  [x]  Patient tolerated treatment well  []  Patient limited by fatigue  []  Patient limited by pain   []  Patient limited by medical complications  []  Other:     Assessment:  Patient still with incorrect technique with step due to not wanting to bend his knee. GOALS:  Patient Goal: to get back to playing golf    REVISED GOALS:  SHORT TERM GOALS:  DEFERRED TO LTG'S  LONG TERM GOALS:  4 WEEKS  Long Term Goals:  Time Frame: 6 weeks  Increase AROM L knee 0- 120 degrees to allow patient to report able to exercise on bike and elliptical x 30 minutes.   NOT MET- CONTINUE  Increase strength L LE to 4+/5 to allow patient to report able to return to light golfing as OK per MD.  NOT MET- CONTINUE  I with HEP as prescribed to allow patient to return to yard work , mowing x 30 minutes with no difficulty  NOT MET CONTINUE    Patient Education:   []

## 2023-05-01 ENCOUNTER — HOSPITAL ENCOUNTER (OUTPATIENT)
Dept: PHYSICAL THERAPY | Age: 78
Setting detail: THERAPIES SERIES
Discharge: HOME | End: 2023-05-01
Payer: COMMERCIAL

## 2023-05-01 PROCEDURE — 97110 THERAPEUTIC EXERCISES: CPT

## 2023-05-01 NOTE — PROGRESS NOTES
weeks  Increase AROM L knee 0- 120 degrees to allow patient to report able to exercise on bike and elliptical x 30 minutes. Increase strength L LE to 4+/5 to allow patient to report able to return to light golfing as OK per MD.    I with HEP as prescribed to allow patient to return to yard work , mowing x 30 minutes with no difficulty      Patient Education:   [x]  HEP/Education Completed: Access Code: [email or web address]  - Standing Hip Abduction with Counter Support  - 2 x daily - 7 x weekly - 1 sets - 5 reps  - Standing Hip Extension with Counter Support  - 2 x daily - 7 x weekly - 1 sets - 5 reps  - Standing Hip Flexion with Counter Support  - 2 x daily - 7 x weekly - 1 sets - 5 reps  []  No new Education completed  [x]  Reviewed Prior HEP      []  Patient verbalized and/or demonstrated understanding of education provided. []  Patient unable to verbalize and/or demonstrate understanding of education provided. Will continue education. [x]  Barriers to learning: none    PLAN:  Treatment Recommendations: Strengthening, Range of Motion, Balance Training, Gait Training, Stair Training, Home Exercise Program, and Patient Education    []  Plan of care initiated.   Plan to see patient 3 times per week for 4 weeks to address the treatment planned outlined above  [x]  Continue with current plan of care  []  Modify plan of care as follows:    []  Hold pending physician visit  []  Discharge    Time In 0758   Time Out 0830   Timed Code Minutes: 32 min   Total Treatment Time: 32 min       Electronically Signed by: Sienna Lynn PTA

## 2023-05-03 ENCOUNTER — HOSPITAL ENCOUNTER (OUTPATIENT)
Dept: PHYSICAL THERAPY | Age: 78
Setting detail: THERAPIES SERIES
Discharge: HOME | End: 2023-05-03
Payer: COMMERCIAL

## 2023-05-03 PROCEDURE — 97110 THERAPEUTIC EXERCISES: CPT

## 2023-05-03 NOTE — PROGRESS NOTES
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       PRECERTIFICATION REQUIRED:  No  INSURANCE THERAPY BENEFIT:  Patient has unlimited visits based on medical necessity  Benefit will not cover maintenance or preventative treatment. AQUATIC THERAPY COVERED:   Yes  MODALITIES COVERED:  Yes, with the exception of iontophoresis and hot packs/cold packs   Visit # 11, 4/10 for progress note   Visits Allowed: unlimited   Recertification Date: 53   Physician Follow-Up: 5 weeks, unsure of date   Physician Orders:    History of Present Illness: L knee pain x 30 years. Had knee scope 30+ years ago. Did 2 cortisone injections Fall  with family MD.  Reta Medina for consult with Dr. Red Amaral in [facility] 2023.   3/6/23 L TKR with Home health PT until f/u with MD on 3/22 and OP PT ordered. SUBJECTIVE: Patient states feeling pretty good today.     OBJECTIVE:    TREATMENT   Precautions: none   Pain: Denies pain, only stiffness    \"X in shaded column indicates activity completed today    *\" next to exercise/intervention indicates progression

## 2023-05-05 ENCOUNTER — HOSPITAL ENCOUNTER (OUTPATIENT)
Dept: PHYSICAL THERAPY | Age: 78
Setting detail: THERAPIES SERIES
Discharge: HOME | End: 2023-05-05
Payer: COMMERCIAL

## 2023-05-05 PROCEDURE — 97110 THERAPEUTIC EXERCISES: CPT

## 2023-05-05 NOTE — PROGRESS NOTES
[address] [facility]  PHYSICAL THERAPY  [] EVALUATION  [x] DAILY NOTE (LAND) [] DAILY NOTE (AQUATIC ) [] PROGRESS NOTE [] DISCHARGE NOTE    [] [address] [facility]   [x] [address]    [] [facility]   [] Bonita Rolon    Date: 2023  Patient Name:  Ronaldo Peters  : 1945  MRN: [id number]  CSN: [id number]    Referring Practitioner Willy Boo DO   Diagnosis L TKR,  M17.12    Treatment Diagnosis L knee pain, difficulty walking decreased balance   Date of Evaluation 3/29/23   Additional Pertinent History High cholesterol      Functional Outcome Measure Used KOOS JR   Functional Outcome Score Eval score 14 (3/29/23)       Insurance: Primary: Payor: [employer] /  /  / ,   Secondary:    Authorization Information: INSURANCE PAYS AT:   80%              PATIENT RESPONSIBILITY AND/OR CO-PAY:  20%  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       PRECERTIFICATION REQUIRED:  No  INSURANCE THERAPY BENEFIT:  Patient has unlimited visits based on medical necessity  Benefit will not cover maintenance or preventative treatment. AQUATIC THERAPY COVERED:   Yes  MODALITIES COVERED:  Yes, with the exception of iontophoresis and hot packs/cold packs   Visit # 12, 5/10 for progress note   Visits Allowed: unlimited   Recertification Date:    Physician Follow-Up: 5/10 f/u with Dr. Cj Pyle   Physician Orders:    History of Present Illness: L knee pain x 30 years. Had knee scope 30+ years ago. Did 2 cortisone injections Fall  with family MD.  Valentina Sampson for consult with Dr. Cj Pyle in [facility] 2023.   3/6/23 L TKR with Home health PT until f/u with MD on 3/22 and OP PT ordered.        SUBJECTIVE: reports feeling stiffness yesterday 2/10    OBJECTIVE:    TREATMENT   Precautions: none   Pain: Denies pain, only stiffness    \"X in shaded column indicates activity completed today    *\" next to exercise/intervention indicates progression

## 2023-05-08 ENCOUNTER — OFFICE VISIT (OUTPATIENT)
Dept: FAMILY MEDICINE CLINIC | Age: 78
End: 2023-05-08
Payer: COMMERCIAL

## 2023-05-08 ENCOUNTER — HOSPITAL ENCOUNTER (OUTPATIENT)
Dept: PHYSICAL THERAPY | Age: 78
Setting detail: THERAPIES SERIES
Discharge: HOME | End: 2023-05-08
Payer: COMMERCIAL

## 2023-05-08 VITALS
RESPIRATION RATE: 16 BRPM | HEIGHT: 72 IN | DIASTOLIC BLOOD PRESSURE: 88 MMHG | TEMPERATURE: 97.5 F | BODY MASS INDEX: 27.28 KG/M2 | HEART RATE: 73 BPM | WEIGHT: 201.4 LBS | SYSTOLIC BLOOD PRESSURE: 130 MMHG | OXYGEN SATURATION: 99 %

## 2023-05-08 DIAGNOSIS — E78.00 PURE HYPERCHOLESTEROLEMIA: Primary | ICD-10-CM

## 2023-05-08 DIAGNOSIS — N40.1 BENIGN PROSTATIC HYPERPLASIA WITH NOCTURIA: ICD-10-CM

## 2023-05-08 DIAGNOSIS — D50.9 IRON DEFICIENCY ANEMIA, UNSPECIFIED IRON DEFICIENCY ANEMIA TYPE: ICD-10-CM

## 2023-05-08 DIAGNOSIS — R35.1 BENIGN PROSTATIC HYPERPLASIA WITH NOCTURIA: ICD-10-CM

## 2023-05-08 DIAGNOSIS — K29.60 EROSIVE GASTRITIS: ICD-10-CM

## 2023-05-08 DIAGNOSIS — R63.1 EXCESSIVE THIRST: ICD-10-CM

## 2023-05-08 LAB
BILIRUBIN, POC: NEGATIVE
BLOOD URINE, POC: NEGATIVE
CLARITY, POC: CLEAR
COLOR, POC: YELLOW
GLUCOSE URINE, POC: NEGATIVE
KETONES, POC: NEGATIVE
LEUKOCYTE EST, POC: NEGATIVE
NITRITE, POC: NEGATIVE
PH, POC: 6
PROTEIN, POC: NEGATIVE
SPECIFIC GRAVITY, POC: 1.02
UROBILINOGEN, POC: 0.2

## 2023-05-08 PROCEDURE — 1123F ACP DISCUSS/DSCN MKR DOCD: CPT | Performed by: FAMILY MEDICINE

## 2023-05-08 PROCEDURE — 99214 OFFICE O/P EST MOD 30 MIN: CPT | Performed by: FAMILY MEDICINE

## 2023-05-08 PROCEDURE — 81003 URINALYSIS AUTO W/O SCOPE: CPT | Performed by: FAMILY MEDICINE

## 2023-05-08 PROCEDURE — 1036F TOBACCO NON-USER: CPT | Performed by: FAMILY MEDICINE

## 2023-05-08 PROCEDURE — 97110 THERAPEUTIC EXERCISES: CPT

## 2023-05-08 PROCEDURE — G8427 DOCREV CUR MEDS BY ELIG CLIN: HCPCS | Performed by: FAMILY MEDICINE

## 2023-05-08 PROCEDURE — G8417 CALC BMI ABV UP PARAM F/U: HCPCS | Performed by: FAMILY MEDICINE

## 2023-05-08 RX ORDER — TAMSULOSIN HYDROCHLORIDE 0.4 MG/1
0.4 CAPSULE ORAL DAILY
Qty: 90 CAPSULE | Refills: 3 | Status: SHIPPED | OUTPATIENT
Start: 2023-05-08

## 2023-05-08 RX ORDER — LOVASTATIN 20 MG/1
20 TABLET ORAL DAILY
Qty: 90 TABLET | Refills: 3 | Status: SHIPPED | OUTPATIENT
Start: 2023-05-08

## 2023-05-08 ASSESSMENT — ENCOUNTER SYMPTOMS
SHORTNESS OF BREATH: 0
WHEEZING: 0

## 2023-05-08 NOTE — PROGRESS NOTES
[facility]  [facility]  [address] [facility]  Dept: [phone]  Dept Fax: [phone]  Loc: [phone]  PROGRESS NOTE      Visit Date: 5/8/2023    Rachael Mckenna is a 65 y.o. male who presents today for:  Chief Complaint   Patient presents with    Insomnia     Having excessive thirst at night, using bathroom during the night, some times cannot go back to sleep once he is up. Subjective:  HPI    Follow-up  Hyperlipidemia: On lovastatin. Lipids in oct 2022. Anemia. Appt with GI on about may 17th. On nexium. New problem:  Excessive thirst at night. Has trouble falling back asleep after getting up to use the restroom. Wakes 3-4 times per night to urinate. Gradual onset of symptoms. Has been drinking more water. Does not restrict fluids prior to bedtime. April 2023 had orbital fx. Treating non-op. Seen at [facility]. Review of Systems   Constitutional:  Negative for chills and fever. Respiratory:  Negative for shortness of breath and wheezing. Genitourinary:  Positive for frequency and urgency. Negative for dysuria.    Patient Active Problem List   Diagnosis    History of cold sores    Hyperlipidemia    Glucose intolerance (impaired glucose tolerance)    Hematochezia    Constipation    Pre-diabetes    Renal cysts, acquired, bilateral    Primary osteoarthritis of left knee    Syncope and collapse    Erosive gastritis    Closed fracture of maxillary sinus (HCC)    Closed fracture of left orbit (HCC)    Iron deficiency anemia     Past Medical History:   Diagnosis Date    Arthritis     Erectile dysfunction     Glucose intolerance (impaired glucose tolerance)     Hyperlipidemia     Renal cyst, left 7-2001    Dr. Kenn Sparks      Past Surgical History:   Procedure Laterality Date    COLONOSCOPY      KNEE ARTHROSCOPY Left     TONSILLECTOMY      TOTAL KNEE ARTHROPLASTY Left 03/2023    UPPER GASTROINTESTINAL ENDOSCOPY

## 2023-05-08 NOTE — PROGRESS NOTES
[facility]  PHYSICAL THERAPY  [] EVALUATION  [x] DAILY NOTE (LAND) [] DAILY NOTE (AQUATIC ) [] PROGRESS NOTE [] DISCHARGE NOTE    [] [address] [facility]   [x] [place] [address]    [] [facility]   [] Zenia Uribe    Date: 2023  Patient Name:  Brooke Mills  : 1945  MRN: [id number]  CSN: [id number]    Referring Practitioner Danielle Tejeda,    Diagnosis L TKR,  M17.12    Treatment Diagnosis L knee pain, difficulty walking decreased balance   Date of Evaluation 3/29/23   Additional Pertinent History High cholesterol      Functional Outcome Measure Used KOOS JR   Functional Outcome Score Eval score 14 (3/29/23)       Insurance: Primary: Payor: [employer] /  /  / ,   Secondary:    Authorization Information: INSURANCE PAYS AT:   80%              PATIENT RESPONSIBILITY AND/OR CO-PAY:  20%  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       PRECERTIFICATION REQUIRED:  No  INSURANCE THERAPY BENEFIT:  Patient has unlimited visits based on medical necessity  Benefit will not cover maintenance or preventative treatment. AQUATIC THERAPY COVERED:   Yes  MODALITIES COVERED:  Yes, with the exception of iontophoresis and hot packs/cold packs   Visit # 13, 6/10 for progress note   Visits Allowed: unlimited   Recertification Date: 49   Physician Follow-Up: 5/10 f/u with Dr. Yoko Gillespie   Physician Orders:    History of Present Illness: L knee pain x 30 years. Had knee scope 30+ years ago. Did 2 cortisone injections Fall  with family MD.  Jennifer Levine for consult with Dr. Yoko Gillespie in [facility] 2023.   3/6/23 L TKR with Home health PT until f/u with MD on 3/22 and OP PT ordered. SUBJECTIVE: Pt states he had a good weekend, some stiffness today, thinks swelling Is doing better today.     OBJECTIVE:    TREATMENT   Precautions: none   Pain: Denies pain, only stiffness    \"X in shaded column indicates activity completed today    *\" Routed to Dr. Gonzáles, please advise. Thanks.

## 2023-05-09 ENCOUNTER — HOSPITAL ENCOUNTER (OUTPATIENT)
Age: 78
End: 2023-05-09

## 2023-05-11 ENCOUNTER — HOSPITAL ENCOUNTER (OUTPATIENT)
Dept: PHYSICAL THERAPY | Age: 78
Setting detail: THERAPIES SERIES
Discharge: HOME | End: 2023-05-11
Payer: COMMERCIAL

## 2023-05-11 ENCOUNTER — HOSPITAL ENCOUNTER (OUTPATIENT)
Age: 78
Discharge: HOME | End: 2023-05-11
Payer: MEDICARE

## 2023-05-11 DIAGNOSIS — D50.9 IRON DEFICIENCY ANEMIA, UNSPECIFIED IRON DEFICIENCY ANEMIA TYPE: ICD-10-CM

## 2023-05-11 DIAGNOSIS — R63.1 EXCESSIVE THIRST: ICD-10-CM

## 2023-05-11 DIAGNOSIS — E78.00 PURE HYPERCHOLESTEROLEMIA: ICD-10-CM

## 2023-05-11 LAB
ANION GAP SERPL CALC-SCNC: 16 MEQ/L (ref 8–16)
BASOPHILS ABSOLUTE: 0 THOU/MM3 (ref 0–0.1)
BASOPHILS NFR BLD AUTO: 1.1 %
BUN SERPL-MCNC: 19 MG/DL (ref 7–22)
CALCIUM SERPL-MCNC: 9.5 MG/DL (ref 8.5–10.5)
CHLORIDE SERPL-SCNC: 102 MEQ/L (ref 98–111)
CHOLEST SERPL-MCNC: 156 MG/DL (ref 100–199)
CO2 SERPL-SCNC: 24 MEQ/L (ref 23–33)
CREAT SERPL-MCNC: 0.8 MG/DL (ref 0.4–1.2)
DEPRECATED RDW RBC AUTO: 45.4 FL (ref 35–45)
EOSINOPHIL NFR BLD AUTO: 3.7 %
EOSINOPHILS ABSOLUTE: 0.1 THOU/MM3 (ref 0–0.4)
ERYTHROCYTE [DISTWIDTH] IN BLOOD BY AUTOMATED COUNT: 12.6 % (ref 11.5–14.5)
GFR SERPL CREATININE-BSD FRML MDRD: > 60 ML/MIN/1.73M2
GLUCOSE SERPL-MCNC: 125 MG/DL (ref 70–108)
HCT VFR BLD AUTO: 36.5 % (ref 42–52)
HDLC SERPL-MCNC: 50 MG/DL
HGB BLD-MCNC: 11.2 GM/DL (ref 14–18)
IMM GRANULOCYTES # BLD AUTO: 0.01 THOU/MM3 (ref 0–0.07)
IMM GRANULOCYTES NFR BLD AUTO: 0.3 %
LDLC SERPL CALC-MCNC: 89 MG/DL
LYMPHOCYTES ABSOLUTE: 1 THOU/MM3 (ref 1–4.8)
LYMPHOCYTES NFR BLD AUTO: 29.7 %
MCH RBC QN AUTO: 29.7 PG (ref 26–33)
MCHC RBC AUTO-ENTMCNC: 30.7 GM/DL (ref 32.2–35.5)
MCV RBC AUTO: 96.8 FL (ref 80–94)
MONOCYTES ABSOLUTE: 0.3 THOU/MM3 (ref 0.4–1.3)
MONOCYTES NFR BLD AUTO: 9.6 %
NEUTROPHILS NFR BLD AUTO: 55.6 %
NRBC BLD AUTO-RTO: 0 /100 WBC
PLATELET # BLD AUTO: 237 THOU/MM3 (ref 130–400)
PMV BLD AUTO: 8.5 FL (ref 9.4–12.4)
POTASSIUM SERPL-SCNC: 4.3 MEQ/L (ref 3.5–5.2)
RBC # BLD AUTO: 3.77 MILL/MM3 (ref 4.7–6.1)
SEGMENTED NEUTROPHILS ABSOLUTE COUNT: 1.9 THOU/MM3 (ref 1.8–7.7)
SODIUM SERPL-SCNC: 142 MEQ/L (ref 135–145)
TRIGL SERPL-MCNC: 83 MG/DL (ref 0–199)
TSH SERPL DL<=0.005 MIU/L-ACNC: 1.45 UIU/ML (ref 0.4–4.2)
WBC # BLD AUTO: 3.5 THOU/MM3 (ref 4.8–10.8)

## 2023-05-11 PROCEDURE — 84443 ASSAY THYROID STIM HORMONE: CPT

## 2023-05-11 PROCEDURE — 36415 COLL VENOUS BLD VENIPUNCTURE: CPT

## 2023-05-11 PROCEDURE — 85025 COMPLETE CBC W/AUTO DIFF WBC: CPT

## 2023-05-11 PROCEDURE — 80061 LIPID PANEL: CPT

## 2023-05-11 PROCEDURE — 80048 BASIC METABOLIC PNL TOTAL CA: CPT

## 2023-05-11 NOTE — DISCHARGE SUMMARY
[facility] NOTE  OUTPATIENT  [address] [facility].    Patient Name: Alfonso Gifford        CSN: [id number]   YOB: 1945  Gender: male  Michelle Webster DO, l kneww replacement ,      Patient is discharged from Physical Therapy services at this time. See last note for details related to results of therapy and goal achievement. Reason for discharge: Was on hold for physician appointment. No additional therapy required at this time. Elmer Tapia.  Jennifer Winchester # [id number]

## 2023-05-12 ENCOUNTER — TELEPHONE (OUTPATIENT)
Dept: FAMILY MEDICINE CLINIC | Age: 78
End: 2023-05-12

## 2023-05-12 DIAGNOSIS — D53.9 MACROCYTIC ANEMIA: Primary | ICD-10-CM

## 2023-05-12 NOTE — TELEPHONE ENCOUNTER
----- Message from Rashaad Elkins MD sent at 5/12/2023 12:32 PM EDT -----  BMP shows mildly elevated glucose. (Normal A1c in February 2023). CBC shows mild anemia similar to previous with slightly decreased WBCs of unclear significance. Cholesterol levels and thyroid level are good. Recommend recheck of blood counts/CBC in 1 to 2 months:  this is ordered. Please advise patient.   Rashaad Elkins MD

## 2023-05-16 ENCOUNTER — APPOINTMENT (OUTPATIENT)
Dept: PHYSICAL THERAPY | Age: 78
End: 2023-05-16
Payer: COMMERCIAL

## 2023-06-20 ENCOUNTER — HOSPITAL ENCOUNTER (OUTPATIENT)
Age: 78
Discharge: HOME | End: 2023-06-20
Payer: COMMERCIAL

## 2023-06-20 ENCOUNTER — TELEPHONE (OUTPATIENT)
Dept: FAMILY MEDICINE CLINIC | Age: 78
End: 2023-06-20

## 2023-06-20 DIAGNOSIS — D53.9 MACROCYTIC ANEMIA: ICD-10-CM

## 2023-06-20 LAB
BASOPHILS ABSOLUTE: 0 THOU/MM3 (ref 0–0.1)
BASOPHILS NFR BLD AUTO: 1 %
DEPRECATED RDW RBC AUTO: 46.2 FL (ref 35–45)
EOSINOPHIL NFR BLD AUTO: 2.7 %
EOSINOPHILS ABSOLUTE: 0.1 THOU/MM3 (ref 0–0.4)
ERYTHROCYTE [DISTWIDTH] IN BLOOD BY AUTOMATED COUNT: 14 % (ref 11.5–14.5)
FOLATE SERPL-MCNC: 10.6 NG/ML (ref 4.8–24.2)
HCT VFR BLD AUTO: 40.3 % (ref 42–52)
HGB BLD-MCNC: 12 GM/DL (ref 14–18)
IMM GRANULOCYTES # BLD AUTO: 0.01 THOU/MM3 (ref 0–0.07)
IMM GRANULOCYTES NFR BLD AUTO: 0.3 %
LYMPHOCYTES ABSOLUTE: 0.7 THOU/MM3 (ref 1–4.8)
LYMPHOCYTES NFR BLD AUTO: 22.5 %
MCH RBC QN AUTO: 27.2 PG (ref 26–33)
MCHC RBC AUTO-ENTMCNC: 29.8 GM/DL (ref 32.2–35.5)
MCV RBC AUTO: 91.4 FL (ref 80–94)
MONOCYTES ABSOLUTE: 0.3 THOU/MM3 (ref 0.4–1.3)
MONOCYTES NFR BLD AUTO: 8.9 %
NEUTROPHILS NFR BLD AUTO: 64.6 %
NRBC BLD AUTO-RTO: 0 /100 WBC
PLATELET # BLD AUTO: 211 THOU/MM3 (ref 130–400)
PMV BLD AUTO: 8.6 FL (ref 9.4–12.4)
RBC # BLD AUTO: 4.41 MILL/MM3 (ref 4.7–6.1)
SEGMENTED NEUTROPHILS ABSOLUTE COUNT: 1.9 THOU/MM3 (ref 1.8–7.7)
VIT B12 SERPL-MCNC: 440 PG/ML (ref 211–911)
WBC # BLD AUTO: 2.9 THOU/MM3 (ref 4.8–10.8)

## 2023-06-20 PROCEDURE — 36415 COLL VENOUS BLD VENIPUNCTURE: CPT

## 2023-06-20 PROCEDURE — 85025 COMPLETE CBC W/AUTO DIFF WBC: CPT

## 2023-06-20 PROCEDURE — 82607 VITAMIN B-12: CPT

## 2023-06-20 PROCEDURE — 82746 ASSAY OF FOLIC ACID SERUM: CPT

## 2023-06-20 NOTE — TELEPHONE ENCOUNTER
Patient called in stating that he was under the understanding that Dr. Glo Riley was going to have lab work ordered for his to get his GLUCOSE tested because the last time he had it done it was high. Patient went and had blood work done today and had fasted for it thinking he was going to have his GLUCOSE tested and it was not one that they were drawing for. Patient is wanting to know if Dr. Glo Riley could put an order in for him to have his GLUCOSE tested? If so please inform the patient.

## 2023-06-20 NOTE — TELEPHONE ENCOUNTER
Patient had A1c done on 2/8/23 that was 5.4% and glucose was 102. Patient informed and verbalized understanding.

## 2023-06-21 ENCOUNTER — TELEPHONE (OUTPATIENT)
Dept: FAMILY MEDICINE CLINIC | Age: 78
End: 2023-06-21

## 2023-06-21 DIAGNOSIS — D72.818 OTHER DECREASED WHITE BLOOD CELL COUNT: Primary | ICD-10-CM

## 2023-06-21 NOTE — TELEPHONE ENCOUNTER
Spoke with patient and informed.  Voiced understanding and agreeable to referral and ok with whomever Dr. Fela Garcia recommends

## 2023-06-21 NOTE — TELEPHONE ENCOUNTER
----- Message from Freeman Millan MD sent at 6/21/2023  2:22 PM EDT -----  B12 and folate level are good. Improving anemia with hemoglobin of 12.0. Decreased WBC count worse than previous. Recommend referral to hematology. Please advise patient.   Freeman Millan MD

## 2023-07-13 ENCOUNTER — CLINICAL DOCUMENTATION (OUTPATIENT)
Dept: CASE MANAGEMENT | Age: 78
End: 2023-07-13

## 2023-07-13 ENCOUNTER — HOSPITAL ENCOUNTER (OUTPATIENT)
Dept: INFUSION THERAPY | Age: 78
Discharge: HOME | End: 2023-07-13
Payer: COMMERCIAL

## 2023-07-13 ENCOUNTER — OFFICE VISIT (OUTPATIENT)
Dept: ONCOLOGY | Age: 78
End: 2023-07-13
Payer: COMMERCIAL

## 2023-07-13 VITALS
OXYGEN SATURATION: 98 % | DIASTOLIC BLOOD PRESSURE: 68 MMHG | HEART RATE: 66 BPM | TEMPERATURE: 97.7 F | RESPIRATION RATE: 18 BRPM | SYSTOLIC BLOOD PRESSURE: 124 MMHG

## 2023-07-13 VITALS
BODY MASS INDEX: 25.08 KG/M2 | TEMPERATURE: 97.7 F | OXYGEN SATURATION: 98 % | SYSTOLIC BLOOD PRESSURE: 124 MMHG | RESPIRATION RATE: 18 BRPM | HEART RATE: 66 BPM | WEIGHT: 195.4 LBS | HEIGHT: 74 IN | DIASTOLIC BLOOD PRESSURE: 68 MMHG

## 2023-07-13 DIAGNOSIS — D72.810 LYMPHOPENIA: ICD-10-CM

## 2023-07-13 DIAGNOSIS — D50.9 IRON DEFICIENCY ANEMIA, UNSPECIFIED IRON DEFICIENCY ANEMIA TYPE: Primary | ICD-10-CM

## 2023-07-13 PROCEDURE — 1036F TOBACCO NON-USER: CPT | Performed by: INTERNAL MEDICINE

## 2023-07-13 PROCEDURE — G8417 CALC BMI ABV UP PARAM F/U: HCPCS | Performed by: INTERNAL MEDICINE

## 2023-07-13 PROCEDURE — 99211 OFF/OP EST MAY X REQ PHY/QHP: CPT

## 2023-07-13 PROCEDURE — 99204 OFFICE O/P NEW MOD 45 MIN: CPT | Performed by: INTERNAL MEDICINE

## 2023-07-13 PROCEDURE — G8427 DOCREV CUR MEDS BY ELIG CLIN: HCPCS | Performed by: INTERNAL MEDICINE

## 2023-07-13 PROCEDURE — 1123F ACP DISCUSS/DSCN MKR DOCD: CPT | Performed by: INTERNAL MEDICINE

## 2023-07-13 NOTE — PROGRESS NOTES
Name: Lefty Cummins  : 1945  MRN: [id number]    Oncology Navigation- Initial Note:    Intake-  Contact Type: Medical Oncology    Diagnosis:  Low WBC    Home Disposition: Lives with other who is able to assist  -Independent  -Physically active  -Drives  -Left total Knee replacement 3/6;  s/p fall , +GI bleed. Patient needs and barriers to care: Coordination of Care     Referral Source: Outpatient    Interventions-   General Interventions: navigation explained. Education/Screenings:  yes -     ONC POC:  -Discussed recent surgery, GI bleed, & reviewed medication list.    -Discussed lab results; suspect bone marrow recovery s/p surgery & GI bleed.  -Plan for return in 2 months with labwork  -ONC request pt fast x12 hours for FE studies to be drawn with appt. -Pt to call for any sx development     Continuum of Care:  work up evaluation    Notes: Navigation is available if needed.     Electronically signed by Raf Cornejo RN on 2023 at 3:46 PM

## 2023-07-13 NOTE — PROGRESS NOTES
[facility] CENTER  [address] [place] [address]  Dept: [phone]  Loc: [phone]   Hematology/Oncology Consult (Clinic)        07/13/23       Sasha Butts   1945     MD Shabbir Travis MD       Reason:   Chief Complaint   Patient presents with    New Patient     Decreased WBC Count          HPI: Mr. Lawrence Skelton is a 74-year-old male with a past medical history of erosive gastritis, impaired glucose tolerance, hyperlipidemia, hematochezia, erosive gastritis (on Nexium) iron deficiency anemia who presents for leukopenia. OF NOTE: Patient had been admitted 4/9/2023 - 4/11/2023 for acute blood loss anemia due to upper GI bleed, status post left knee surgery 3/5/2023 in [facility] approximately one month prior, was on Decadron 3/6/23-3/8/23, again Medrol pack 3/23/23-3/29/23 and Mobic 15 mg - one month, Keflex 500 3/6/23-3/12/23, ASA 3/6/23-4/6/23, was on Aleve regularly (did not take at the same time as Mobic) hemoglobin illicitly dropped down to 4 g, with positive fecal occult and melena, patient had symptomatic orthostasis, passed out and has a left periorbital fracture due to trauma from syncope. Also had hydrocodone briefly one week post op.  -4/11/2023 iron 88, iron saturation 31%, TIBC 283, ferritin 108 ng/mL. (Collected after multiple blood transfusions).   -4/11/2023 Dr Robert Parham did EGD which showed erosive gastritis in the distal part of the body and antrum with no stigmata of acute GI bleed seen    -5/8/2023 patient had presented to his PCP Dr. Lisa Smith for follow-up for hyperlipidemia, and anemia had an appoint with GI on May 17, patient was on Nexium.   At that time his 4/11/2023 H&H was reviewed which was hemoglobin 9.3, hematocrit 29.7.  (Of note had normal hemoglobin and normal indices as of October 2022, normal hemoglobin with slightly macrocytic indices 2/8/2023.  -5/11/2023 WBC 3.5 (normal

## 2023-07-31 ENCOUNTER — OFFICE VISIT (OUTPATIENT)
Dept: FAMILY MEDICINE CLINIC | Age: 78
End: 2023-07-31
Payer: COMMERCIAL

## 2023-07-31 VITALS
BODY MASS INDEX: 24.95 KG/M2 | TEMPERATURE: 97.7 F | OXYGEN SATURATION: 99 % | SYSTOLIC BLOOD PRESSURE: 122 MMHG | RESPIRATION RATE: 18 BRPM | HEIGHT: 74 IN | DIASTOLIC BLOOD PRESSURE: 68 MMHG | WEIGHT: 194.4 LBS | HEART RATE: 83 BPM

## 2023-07-31 DIAGNOSIS — J02.9 ACUTE VIRAL PHARYNGITIS: Primary | ICD-10-CM

## 2023-07-31 DIAGNOSIS — J02.9 SORE THROAT: ICD-10-CM

## 2023-07-31 LAB
Lab: NORMAL
QC PASS/FAIL: NORMAL
S PYO AG THROAT QL: NORMAL
SARS-COV-2 RDRP RESP QL NAA+PROBE: NEGATIVE

## 2023-07-31 PROCEDURE — 87635 SARS-COV-2 COVID-19 AMP PRB: CPT | Performed by: NURSE PRACTITIONER

## 2023-07-31 PROCEDURE — 1123F ACP DISCUSS/DSCN MKR DOCD: CPT | Performed by: NURSE PRACTITIONER

## 2023-07-31 PROCEDURE — 99213 OFFICE O/P EST LOW 20 MIN: CPT | Performed by: NURSE PRACTITIONER

## 2023-07-31 PROCEDURE — 87880 STREP A ASSAY W/OPTIC: CPT | Performed by: NURSE PRACTITIONER

## 2023-07-31 ASSESSMENT — ENCOUNTER SYMPTOMS
VISUAL CHANGE: 0
COUGH: 1
SORE THROAT: 1
NAUSEA: 0
SWOLLEN GLANDS: 0
VOMITING: 0
SHORTNESS OF BREATH: 0
ABDOMINAL PAIN: 0
CHANGE IN BOWEL HABIT: 0

## 2023-07-31 NOTE — PROGRESS NOTES
[facility] PROFESSIONAL [facility]  [address]  [facility] [address]  Dept: [phone]  Loc: [address] [place] [address] (:  1945) is a 65 y.o. male, here for evaluation of the following chief complaint(s): Other (Sore throat x 1 week)      ASSESSMENT/PLAN:  1. Acute viral pharyngitis  2. Sore throat  -     POCT rapid strep A  -     POCT COVID-19 Rapid, NAAT    Strep negative. Suspect viral pharyngitis. Discussed symptom management. Patient advised to call if symptoms worsen or he develops new symptoms. No follow-ups on file. SUBJECTIVE/OBJECTIVE:  Patient states started with sore throat last week that has not changed. Has occasional cough. Other  This is a new problem. The current episode started in the past 7 days. The problem occurs constantly. The problem has been unchanged. Associated symptoms include coughing and a sore throat. Pertinent negatives include no abdominal pain, anorexia, arthralgias, change in bowel habit, chest pain, chills, diaphoresis, fatigue, fever, headaches, joint swelling, myalgias, nausea, neck pain, numbness, rash, swollen glands, urinary symptoms, vertigo, visual change, vomiting or weakness. Nothing aggravates the symptoms. He has tried nothing for the symptoms. Review of Systems   Constitutional:  Negative for chills, diaphoresis, fatigue and fever. HENT:  Positive for sore throat. Negative for ear pain, postnasal drip, rhinorrhea, sinus pressure, sinus pain and trouble swallowing. Eyes:  Negative for pain and redness. Respiratory:  Positive for cough. Negative for shortness of breath. Cardiovascular:  Negative for chest pain and palpitations. Gastrointestinal:  Negative for abdominal pain, anorexia, change in bowel habit, nausea and vomiting. Genitourinary:  Negative for difficulty urinating and dysuria.    Musculoskeletal:  Negative for arthralgias, joint swelling, myalgias and neck

## 2023-08-01 ASSESSMENT — ENCOUNTER SYMPTOMS
SINUS PAIN: 0
TROUBLE SWALLOWING: 0
RHINORRHEA: 0
SINUS PRESSURE: 0
EYE REDNESS: 0
EYE PAIN: 0

## 2023-08-21 ENCOUNTER — OFFICE VISIT (OUTPATIENT)
Dept: FAMILY MEDICINE CLINIC | Age: 78
End: 2023-08-21
Payer: MEDICARE

## 2023-08-21 ENCOUNTER — HOSPITAL ENCOUNTER (OUTPATIENT)
Age: 78
Discharge: HOME | End: 2023-08-21
Payer: MEDICARE

## 2023-08-21 VITALS
HEART RATE: 86 BPM | TEMPERATURE: 97.5 F | OXYGEN SATURATION: 96 % | DIASTOLIC BLOOD PRESSURE: 60 MMHG | RESPIRATION RATE: 16 BRPM | SYSTOLIC BLOOD PRESSURE: 120 MMHG | WEIGHT: 196.4 LBS | BODY MASS INDEX: 25.21 KG/M2 | HEIGHT: 74 IN

## 2023-08-21 DIAGNOSIS — R19.7 DIARRHEA, UNSPECIFIED TYPE: ICD-10-CM

## 2023-08-21 DIAGNOSIS — R19.7 DIARRHEA, UNSPECIFIED TYPE: Primary | ICD-10-CM

## 2023-08-21 PROCEDURE — 99213 OFFICE O/P EST LOW 20 MIN: CPT | Performed by: FAMILY MEDICINE

## 2023-08-21 PROCEDURE — 1123F ACP DISCUSS/DSCN MKR DOCD: CPT | Performed by: FAMILY MEDICINE

## 2023-08-21 PROCEDURE — 87427 SHIGA-LIKE TOXIN AG IA: CPT

## 2023-08-21 PROCEDURE — 87177 OVA AND PARASITES SMEARS: CPT

## 2023-08-21 PROCEDURE — 87493 C DIFF AMPLIFIED PROBE: CPT

## 2023-08-21 PROCEDURE — 87045 FECES CULTURE AEROBIC BACT: CPT

## 2023-08-21 ASSESSMENT — ENCOUNTER SYMPTOMS
DIARRHEA: 1
ABDOMINAL PAIN: 0
BLOOD IN STOOL: 0
NAUSEA: 0

## 2023-08-21 NOTE — PROGRESS NOTES
[facility] PROFESSIONAL SERVS  [facility]  [address] [facility]  Dept: [phone]  Dept Fax: [phone]  Loc: [phone]  PROGRESS NOTE      Visit Date: 8/21/2023    Diane Mora is a 65 y.o. male who presents today for:  Chief Complaint   Patient presents with    Diarrhea     Started three weeks ago. Pt stated his mouth is extremely dry at night. Tried pepto bismol for the diarrhea about a week ago. Also tried kaopectate. Drinking plenty of fluids. Subjective:  Diarrhea   Pertinent negatives include no abdominal pain, chills or fever. Diarrhea. x 3-4 weeks. Tried pepto and kaopectate. Augmentin in April. antibiotic in may for dental procedure. Has dry mouth. 3-4 BMs per day. Watery soft stools. No blood in stool. Unsure if mucous in stool. Colonoscopy in 2016. Appetite is good. Vomited x 1 last weekend. No weight loss. No travel out of the country. Was in [place] about 1 week ago. Review of Systems   Constitutional:  Negative for chills and fever. Gastrointestinal:  Positive for diarrhea. Negative for abdominal pain, blood in stool and nausea.    Patient Active Problem List   Diagnosis    History of cold sores    Hyperlipidemia    Glucose intolerance (impaired glucose tolerance)    Hematochezia    Constipation    Pre-diabetes    Renal cysts, acquired, bilateral    Primary osteoarthritis of left knee    Syncope and collapse    Erosive gastritis    Closed fracture of maxillary sinus (HCC)    Closed fracture of left orbit (HCC)    Iron deficiency anemia    Other decreased white blood cell count     Past Medical History:   Diagnosis Date    Arthritis     Erectile dysfunction     Glucose intolerance (impaired glucose tolerance)     Hyperlipidemia     Renal cyst, left 7-2001    Dr. Elias Perez      Past Surgical History:   Procedure Laterality Date    COLONOSCOPY      KNEE ARTHROSCOPY Left     TONSILLECTOMY
Home

## 2023-08-23 LAB
BACTERIA STL CULT: NORMAL
C DIFFICILE TOXINS, PCR: NORMAL
O+P STL TRI STN: NORMAL

## 2023-09-08 NOTE — PROGRESS NOTES
sounds in all four quadrants. Nontender to palpation. No palpable organomegaly (liver or spleen) or masses. GENITOURINARY: Deferred. NEUROLOGIC: Alert and oriented time, person, and place, with no apparent changes in recent or remote memory. Cranial nerves II-XII are grossly intact. Sensation is maintained in the extremities. Strength and tone appear normal for age and equal in the extremities. Gait is normal.  MUSCULOSKELETAL: No tenderness over the spine. No cyanosis, clubbing or edema in the extremities. There are b/l surgical scars on the knees. PSYCHIATRIC: The patient maintains judgment and has good insight. The patient has no changes in mood or affection.       DATA:    LAB:   CBC with Differential:      Lab Results   Component Value Date/Time    WBC 4.3 09/11/2023 08:03 AM    RBC 4.18 09/11/2023 08:03 AM    HGB 11.8 09/11/2023 08:03 AM    HCT 37.1 09/11/2023 08:03 AM     09/11/2023 08:03 AM    MCV 89 09/11/2023 08:03 AM    MCH 28.2 09/11/2023 08:03 AM    MCHC 31.8 09/11/2023 08:03 AM    RDW 16.1 09/11/2023 08:03 AM    NRBC 0 06/20/2023 07:09 AM    SEGSPCT 64.6 06/20/2023 07:09 AM    LYMPHOPCT 28.8 10/15/2022 12:00 AM    MONOPCT 8.9 06/20/2023 07:09 AM    MONOPCT 8.1 10/15/2022 12:00 AM    EOSPCT 3.9 10/15/2022 12:00 AM    BASOPCT 1.1 10/15/2022 12:00 AM    MONOSABS 0.4 09/11/2023 08:03 AM    LYMPHSABS 0.6 09/11/2023 08:03 AM    EOSABS 0.1 09/11/2023 08:03 AM    BASOSABS 0.0 09/11/2023 08:03 AM     Lab Results   Component Value Date/Time    SEGSABS 3.2 09/11/2023 08:03 AM       CMP:    Lab Results   Component Value Date/Time     05/11/2023 07:10 AM    K 4.3 05/11/2023 07:10 AM    K 3.9 04/10/2023 06:15 AM     05/11/2023 07:10 AM    CO2 24 05/11/2023 07:10 AM    BUN 19 05/11/2023 07:10 AM    CREATININE 0.8 05/11/2023 07:10 AM    AGRATIO 1.7 04/06/2019 06:30 AM    LABGLOM >60 05/11/2023 07:10 AM    GLUCOSE 125 05/11/2023 07:10 AM    GLUCOSE 121 04/06/2019 06:30 AM    PROT 6.1 04/09/2023

## 2023-09-11 ENCOUNTER — HOSPITAL ENCOUNTER (OUTPATIENT)
Dept: INFUSION THERAPY | Age: 78
Discharge: HOME | End: 2023-09-11
Payer: MEDICARE

## 2023-09-11 ENCOUNTER — OFFICE VISIT (OUTPATIENT)
Dept: ONCOLOGY | Age: 78
End: 2023-09-11
Payer: MEDICARE

## 2023-09-11 VITALS
RESPIRATION RATE: 16 BRPM | DIASTOLIC BLOOD PRESSURE: 66 MMHG | OXYGEN SATURATION: 97 % | SYSTOLIC BLOOD PRESSURE: 127 MMHG | TEMPERATURE: 98.1 F | HEART RATE: 66 BPM

## 2023-09-11 VITALS
HEIGHT: 74 IN | WEIGHT: 196.4 LBS | HEART RATE: 66 BPM | BODY MASS INDEX: 25.21 KG/M2 | TEMPERATURE: 98.1 F | DIASTOLIC BLOOD PRESSURE: 66 MMHG | SYSTOLIC BLOOD PRESSURE: 127 MMHG | OXYGEN SATURATION: 97 % | RESPIRATION RATE: 16 BRPM

## 2023-09-11 DIAGNOSIS — E78.00 PURE HYPERCHOLESTEROLEMIA: ICD-10-CM

## 2023-09-11 DIAGNOSIS — D72.810 LYMPHOPENIA: ICD-10-CM

## 2023-09-11 DIAGNOSIS — D50.9 IRON DEFICIENCY ANEMIA, UNSPECIFIED IRON DEFICIENCY ANEMIA TYPE: ICD-10-CM

## 2023-09-11 DIAGNOSIS — D50.9 IRON DEFICIENCY ANEMIA, UNSPECIFIED IRON DEFICIENCY ANEMIA TYPE: Primary | ICD-10-CM

## 2023-09-11 LAB
ABSOLUTE IMMATURE GRANULOCYTE: 0.04 THOU/MM3 (ref 0–0.07)
BASOPHILS ABSOLUTE: 0 THOU/MM3 (ref 0–0.1)
BASOPHILS NFR BLD AUTO: 1 % (ref 0–3)
EOSINOPHIL NFR BLD AUTO: 1 % (ref 0–4)
EOSINOPHILS ABSOLUTE: 0.1 THOU/MM3 (ref 0–0.4)
ERYTHROCYTE [DISTWIDTH] IN BLOOD BY AUTOMATED COUNT: 16.1 % (ref 11.5–14.5)
FERRITIN SERPL IA-MCNC: 46 NG/ML (ref 22–322)
HCT VFR BLD AUTO: 37.1 % (ref 42–52)
HGB BLD-MCNC: 11.8 GM/DL (ref 14–18)
IMMATURE GRANULOCYTES: 1 %
IRON SATN MFR SERPL: 10 % (ref 20–50)
IRON SERPL-MCNC: 35 UG/DL (ref 65–195)
LYMPHOCYTES ABSOLUTE: 0.6 THOU/MM3 (ref 1–4.8)
LYMPHOCYTES NFR BLD AUTO: 15 % (ref 15–47)
MCH RBC QN AUTO: 28.2 PG (ref 26–33)
MCHC RBC AUTO-ENTMCNC: 31.8 GM/DL (ref 32.2–35.5)
MCV RBC AUTO: 89 FL (ref 80–94)
MONOCYTES ABSOLUTE: 0.4 THOU/MM3 (ref 0.4–1.3)
MONOCYTES NFR BLD AUTO: 9 % (ref 0–12)
NEUTROPHILS NFR BLD AUTO: 73 % (ref 43–75)
PLATELET # BLD AUTO: 173 THOU/MM3 (ref 130–400)
PMV BLD AUTO: 7.7 FL (ref 9.4–12.4)
RBC # BLD AUTO: 4.18 MILL/MM3 (ref 4.7–6.1)
SEGMENTED NEUTROPHILS ABSOLUTE COUNT: 3.2 THOU/MM3 (ref 1.8–7.7)
TIBC SERPL-MCNC: 354 UG/DL (ref 171–450)
WBC # BLD AUTO: 4.3 THOU/MM3 (ref 4.8–10.8)

## 2023-09-11 PROCEDURE — 99211 OFF/OP EST MAY X REQ PHY/QHP: CPT

## 2023-09-11 PROCEDURE — 83540 ASSAY OF IRON: CPT

## 2023-09-11 PROCEDURE — 83550 IRON BINDING TEST: CPT

## 2023-09-11 PROCEDURE — 85025 COMPLETE CBC W/AUTO DIFF WBC: CPT

## 2023-09-11 PROCEDURE — 1123F ACP DISCUSS/DSCN MKR DOCD: CPT | Performed by: INTERNAL MEDICINE

## 2023-09-11 PROCEDURE — 99214 OFFICE O/P EST MOD 30 MIN: CPT | Performed by: INTERNAL MEDICINE

## 2023-09-11 PROCEDURE — 85046 RETICYTE/HGB CONCENTRATE: CPT

## 2023-09-11 PROCEDURE — 82728 ASSAY OF FERRITIN: CPT

## 2023-09-11 PROCEDURE — 36415 COLL VENOUS BLD VENIPUNCTURE: CPT

## 2023-09-11 RX ORDER — LOVASTATIN 20 MG/1
20 TABLET ORAL DAILY
Qty: 90 TABLET | Refills: 3 | Status: SHIPPED | OUTPATIENT
Start: 2023-09-11

## 2023-09-11 NOTE — TELEPHONE ENCOUNTER
Diane Larsson called requesting a refill on the following medications:  Requested Prescriptions     Pending Prescriptions Disp Refills    lovastatin (MEVACOR) 20 MG tablet 90 tablet 3     Sig: Take 1 tablet by mouth daily       Date of last visit: 8/21/2023  Date of next visit (if applicable):11/8/2023  Date of last refill:   [facility]    Rx pending #90/3- Please send to home delivery pharmacy- patient changed pharmacies      Thanks,  Marbella Cardona LPN

## 2023-09-12 LAB
HGB RETIC QN AUTO: 34.4 PG (ref 28.2–35.7)
IMM RETICS NFR: 24.9 % (ref 2.3–13.4)
RETICS # AUTO: 71 THOU/MM3 (ref 20–115)
RETICS/RBC NFR AUTO: 1.7 % (ref 0.5–2)

## 2023-10-05 ENCOUNTER — OFFICE VISIT (OUTPATIENT)
Dept: FAMILY MEDICINE CLINIC | Age: 78
End: 2023-10-05
Payer: MEDICARE

## 2023-10-05 VITALS
DIASTOLIC BLOOD PRESSURE: 70 MMHG | RESPIRATION RATE: 18 BRPM | BODY MASS INDEX: 26 KG/M2 | OXYGEN SATURATION: 99 % | TEMPERATURE: 97.2 F | WEIGHT: 202.6 LBS | HEART RATE: 72 BPM | SYSTOLIC BLOOD PRESSURE: 110 MMHG | HEIGHT: 74 IN

## 2023-10-05 DIAGNOSIS — Z23 INFLUENZA VACCINE NEEDED: ICD-10-CM

## 2023-10-05 DIAGNOSIS — D72.818 OTHER DECREASED WHITE BLOOD CELL COUNT: ICD-10-CM

## 2023-10-05 DIAGNOSIS — Z00.00 INITIAL MEDICARE ANNUAL WELLNESS VISIT: Primary | ICD-10-CM

## 2023-10-05 DIAGNOSIS — E78.00 PURE HYPERCHOLESTEROLEMIA: ICD-10-CM

## 2023-10-05 PROBLEM — S02.401A CLOSED FRACTURE OF MAXILLARY SINUS (HCC): Status: RESOLVED | Noted: 2023-04-19 | Resolved: 2023-10-05

## 2023-10-05 PROBLEM — R55 SYNCOPE AND COLLAPSE: Status: RESOLVED | Noted: 2023-04-09 | Resolved: 2023-10-05

## 2023-10-05 PROBLEM — S02.85XA CLOSED FRACTURE OF LEFT ORBIT (HCC): Status: RESOLVED | Noted: 2023-04-19 | Resolved: 2023-10-05

## 2023-10-05 PROCEDURE — G0008 ADMIN INFLUENZA VIRUS VAC: HCPCS | Performed by: FAMILY MEDICINE

## 2023-10-05 PROCEDURE — 90694 VACC AIIV4 NO PRSRV 0.5ML IM: CPT | Performed by: FAMILY MEDICINE

## 2023-10-05 PROCEDURE — G0438 PPPS, INITIAL VISIT: HCPCS | Performed by: FAMILY MEDICINE

## 2023-10-05 PROCEDURE — 1123F ACP DISCUSS/DSCN MKR DOCD: CPT | Performed by: FAMILY MEDICINE

## 2023-10-05 ASSESSMENT — PATIENT HEALTH QUESTIONNAIRE - PHQ9
2. FEELING DOWN, DEPRESSED OR HOPELESS: 0
SUM OF ALL RESPONSES TO PHQ QUESTIONS 1-9: 0
SUM OF ALL RESPONSES TO PHQ9 QUESTIONS 1 & 2: 0
1. LITTLE INTEREST OR PLEASURE IN DOING THINGS: 0
SUM OF ALL RESPONSES TO PHQ QUESTIONS 1-9: 0

## 2023-10-05 ASSESSMENT — LIFESTYLE VARIABLES
HOW MANY STANDARD DRINKS CONTAINING ALCOHOL DO YOU HAVE ON A TYPICAL DAY: 1 OR 2
HOW OFTEN DO YOU HAVE A DRINK CONTAINING ALCOHOL: 2-4 TIMES A MONTH

## 2023-10-05 NOTE — PROGRESS NOTES
Vaccine Information Sheet, \"Influenza - Inactivated\"  given to Huong Zhou, or parent/legal guardian of  Huong Zhou and verbalized understanding. Patient responses:    Have you ever had a reaction to a flu vaccine? No  Do you have an allergy to eggs, neomycin or polymixin? No  Do you have an allergy to Thimerosal, contact lens solution, or Merthiolate? No  Have you ever had [place] Syndrome? No  Do you have any current illness? No  Do you have a temperature above 100 degrees? No  Are you pregnant? No  If pregnant, permission obtained from physician? No  Do you have an active neurological disorder? No      Flu vaccine given per order. Please see immunization tab.

## 2023-10-05 NOTE — PROGRESS NOTES
Medicare Annual Wellness Visit    Julia Roman is here for Medicare AWV (Pt stated he has had dry mouth since Easter and would like to discuss. ), Discuss Labs (Would like to discuss lab work  ), and Immunizations (Would like to discuss vaccinations. )    Assessment & Plan   Initial Medicare annual wellness visit  Pure hypercholesterolemia  Other decreased white blood cell count  Influenza vaccine needed  -     Influenza, FLUAD, (age 72 y+), IM, Preservative Free, 0.5 mL      Discussed his decreased WBC count. Follow-up with hematology as planned with blood work in December  Continue lovastatin for hypercholesterolemia  [place] decision to not pursue further testing/evaluation for dry mouth at night    Recommend stopping aspirin as he is on it for primary prevention    Recommendations for Preventive Services Due: see orders and patient instructions/AVS.  Recommended screening schedule for the next 5-10 years is provided to the patient in written form: see Patient Instructions/AVS.     Return in about 1 year (around 10/5/2024) for medicare wellness. Subjective       Dry mouth since easter. Worse at night    Sees hematology for low WBC and anemia. Will be getting cbc in dec. Recent colonoscopy showing colitis. BMs are back to normal.     Patient's complete Health Risk Assessment and screening values have been reviewed and are found in Flowsheets. The following problems were reviewed today and where indicated follow up appointments were made and/or referrals ordered.     Positive Risk Factor Screenings with Interventions:    Fall Risk:  Do you feel unsteady or are you worried about falling? : no  2 or more falls in past year?: no  Fall with injury in past year?: (!) yes (passed out on Easter due to blood loss in abdomen)     Interventions:    Patient declines any further evaluation or treatment                                    Objective   Vitals:    10/05/23 1356   BP: 110/70   Site: Left Upper Arm

## 2023-12-04 NOTE — PROGRESS NOTES
longer on NSAIDs   -macrocytosis likely 2/2 reticulocytosis, B12 and folate WNL  -iron studies still with low iron but ferritin WNL- discussed Doing p.o. iron for 3 months however today's CBC shows that he is improving on his hemoglobin and he feels quite fine. We discussed having him follow-up in approximately 5 to 6 months with FASTING labs 1 week prior to ensure that his iron and anemia resolves. Patient agreeable and said he would do that at a [address] [place] [address] lab a week prior to his visit. -f/u with Dr Katherin Ledesma re: 9/8/2023 colonoscopy pr HPI; has no follow-up of visits, explained to patient if he were to start having abdominal pain, bright red blood per rectum or passing mucus it would be best if he calls Dr. Zack Craft office. Patient verbalizes understanding. 3) Dr Jericho Blake (PCP)  Dr Giovanna Tavera ([address] [place] [address])      4) Medications reviewed. Prescriptions today:            No orders of the defined types were placed in this encounter. OARRS:  Controlled Substance Monitoring:    Acute and Chronic Pain Monitoring:        No data to display                   5) Follow Up:  Return in about 25 weeks (around 6/3/2024) for labs one week prior, review together with Dr Lorne Gutiérrez.      Denver Reyes [facility]

## 2023-12-11 ENCOUNTER — HOSPITAL ENCOUNTER (OUTPATIENT)
Dept: INFUSION THERAPY | Age: 78
Discharge: HOME | End: 2023-12-11
Payer: MEDICARE

## 2023-12-11 ENCOUNTER — OFFICE VISIT (OUTPATIENT)
Dept: ONCOLOGY | Age: 78
End: 2023-12-11
Payer: MEDICARE

## 2023-12-11 VITALS
OXYGEN SATURATION: 97 % | RESPIRATION RATE: 16 BRPM | DIASTOLIC BLOOD PRESSURE: 83 MMHG | SYSTOLIC BLOOD PRESSURE: 145 MMHG | HEART RATE: 71 BPM | TEMPERATURE: 98 F

## 2023-12-11 VITALS
HEART RATE: 71 BPM | HEIGHT: 74 IN | RESPIRATION RATE: 16 BRPM | DIASTOLIC BLOOD PRESSURE: 83 MMHG | SYSTOLIC BLOOD PRESSURE: 145 MMHG | BODY MASS INDEX: 26.59 KG/M2 | TEMPERATURE: 98 F | WEIGHT: 207.2 LBS | OXYGEN SATURATION: 97 %

## 2023-12-11 DIAGNOSIS — D72.810 LYMPHOPENIA: ICD-10-CM

## 2023-12-11 DIAGNOSIS — D50.9 IRON DEFICIENCY ANEMIA, UNSPECIFIED IRON DEFICIENCY ANEMIA TYPE: Primary | ICD-10-CM

## 2023-12-11 DIAGNOSIS — D50.9 IRON DEFICIENCY ANEMIA, UNSPECIFIED IRON DEFICIENCY ANEMIA TYPE: ICD-10-CM

## 2023-12-11 LAB
ABSOLUTE IMMATURE GRANULOCYTE: 0.01 THOU/MM3 (ref 0–0.07)
BASOPHILS ABSOLUTE: 0 THOU/MM3 (ref 0–0.1)
BASOPHILS NFR BLD AUTO: 1 % (ref 0–3)
EOSINOPHIL NFR BLD AUTO: 2 % (ref 0–4)
EOSINOPHILS ABSOLUTE: 0.1 THOU/MM3 (ref 0–0.4)
ERYTHROCYTE [DISTWIDTH] IN BLOOD BY AUTOMATED COUNT: 14.9 % (ref 11.5–14.5)
HCT VFR BLD AUTO: 40 % (ref 42–52)
HGB BLD-MCNC: 13.3 GM/DL (ref 14–18)
HGB RETIC QN AUTO: 36.2 PG (ref 28.2–35.7)
IMM RETICS NFR: 14.9 % (ref 2.3–13.4)
IMMATURE GRANULOCYTES: 0 %
LYMPHOCYTES ABSOLUTE: 0.8 THOU/MM3 (ref 1–4.8)
LYMPHOCYTES NFR BLD AUTO: 16 % (ref 15–47)
MCH RBC QN AUTO: 30.6 PG (ref 26–33)
MCHC RBC AUTO-ENTMCNC: 33.3 GM/DL (ref 32.2–35.5)
MCV RBC AUTO: 92 FL (ref 80–94)
MONOCYTES ABSOLUTE: 0.4 THOU/MM3 (ref 0.4–1.3)
MONOCYTES NFR BLD AUTO: 7 % (ref 0–12)
NEUTROPHILS NFR BLD AUTO: 74 % (ref 43–75)
PLATELET # BLD AUTO: 165 THOU/MM3 (ref 130–400)
PMV BLD AUTO: 8.3 FL (ref 9.4–12.4)
RBC # BLD AUTO: 4.34 MILL/MM3 (ref 4.7–6.1)
RETICS # AUTO: 79 THOU/MM3 (ref 20–115)
RETICS/RBC NFR AUTO: 1.8 % (ref 0.5–2)
SEGMENTED NEUTROPHILS ABSOLUTE COUNT: 3.8 THOU/MM3 (ref 1.8–7.7)
WBC # BLD AUTO: 5.1 THOU/MM3 (ref 4.8–10.8)

## 2023-12-11 PROCEDURE — 1123F ACP DISCUSS/DSCN MKR DOCD: CPT | Performed by: INTERNAL MEDICINE

## 2023-12-11 PROCEDURE — 36415 COLL VENOUS BLD VENIPUNCTURE: CPT

## 2023-12-11 PROCEDURE — 99211 OFF/OP EST MAY X REQ PHY/QHP: CPT

## 2023-12-11 PROCEDURE — 85046 RETICYTE/HGB CONCENTRATE: CPT

## 2023-12-11 PROCEDURE — 99214 OFFICE O/P EST MOD 30 MIN: CPT | Performed by: INTERNAL MEDICINE

## 2023-12-11 PROCEDURE — 85025 COMPLETE CBC W/AUTO DIFF WBC: CPT

## 2023-12-11 NOTE — PATIENT INSTRUCTIONS
Continue to eat healthy over the winter and stay active  RTC in approx 5-6 mo with FASTING labs [address] so Dr Philipp Fay and you can go over all the results together

## 2024-04-02 DIAGNOSIS — E78.00 PURE HYPERCHOLESTEROLEMIA: ICD-10-CM

## 2024-04-02 RX ORDER — LOVASTATIN 20 MG/1
20 TABLET ORAL DAILY
Qty: 90 TABLET | Refills: 2 | Status: SHIPPED | OUTPATIENT
Start: 2024-04-02

## 2024-04-02 NOTE — TELEPHONE ENCOUNTER
Jamie Silverio called requesting a refill on the following medications:  Requested Prescriptions     Pending Prescriptions Disp Refills    lovastatin (MEVACOR) 20 MG tablet 90 tablet 3     Sig: Take 1 tablet by mouth daily       Date of last visit: 10/5/2023  Date of next visit (if applicable):10/7/2024  Date of last refill: 9/11/2023  Pharmacy Name: [facility]  *insurance requires pharmacy change*      Thanks,  Brynn Sinclair LPN

## 2024-05-03 ENCOUNTER — TELEPHONE (OUTPATIENT)
Dept: FAMILY MEDICINE CLINIC | Age: 79
End: 2024-05-03

## 2024-05-03 DIAGNOSIS — R73.09 ELEVATED GLUCOSE: ICD-10-CM

## 2024-05-03 DIAGNOSIS — E78.00 PURE HYPERCHOLESTEROLEMIA: Primary | ICD-10-CM

## 2024-05-03 NOTE — TELEPHONE ENCOUNTER
Jamie states that Mariel Lima MD has left [facility] and he has BW ordered by her. He is wondering if we can add on an A1C?

## 2024-05-03 NOTE — TELEPHONE ENCOUNTER
I reviewed the labs Dr. Lima has ordered.  I ordered A1c, lipids and CMP    Please advise patient.  Sonny Cardenas MD

## 2024-05-23 ENCOUNTER — HOSPITAL ENCOUNTER (OUTPATIENT)
Age: 79
Discharge: HOME | End: 2024-05-23
Payer: MEDICARE

## 2024-05-23 DIAGNOSIS — D50.9 IRON DEFICIENCY ANEMIA, UNSPECIFIED IRON DEFICIENCY ANEMIA TYPE: ICD-10-CM

## 2024-05-23 DIAGNOSIS — E78.00 PURE HYPERCHOLESTEROLEMIA: ICD-10-CM

## 2024-05-23 DIAGNOSIS — D72.810 LYMPHOPENIA: ICD-10-CM

## 2024-05-23 DIAGNOSIS — R73.09 ELEVATED GLUCOSE: ICD-10-CM

## 2024-05-23 LAB
ALBUMIN SERPL BCG-MCNC: 4.6 G/DL (ref 3.5–5.1)
ALP SERPL-CCNC: 110 U/L (ref 38–126)
ALT SERPL W/O P-5'-P-CCNC: 12 U/L (ref 11–66)
ANION GAP SERPL CALC-SCNC: 11 MEQ/L (ref 8–16)
AST SERPL-CCNC: 20 U/L (ref 5–40)
BASOPHILS ABSOLUTE: 0.1 THOU/MM3 (ref 0–0.1)
BASOPHILS NFR BLD AUTO: 1.2 %
BILIRUB SERPL-MCNC: 0.9 MG/DL (ref 0.3–1.2)
BUN SERPL-MCNC: 17 MG/DL (ref 7–22)
CALCIUM SERPL-MCNC: 9.7 MG/DL (ref 8.5–10.5)
CHLORIDE SERPL-SCNC: 104 MEQ/L (ref 98–111)
CHOLEST SERPL-MCNC: 200 MG/DL (ref 100–199)
CO2 SERPL-SCNC: 24 MEQ/L (ref 23–33)
CREAT SERPL-MCNC: 0.9 MG/DL (ref 0.4–1.2)
DEPRECATED MEAN GLUCOSE BLD GHB EST-ACNC: 102 MG/DL (ref 70–126)
DEPRECATED RDW RBC AUTO: 48.8 FL (ref 35–45)
EOSINOPHIL NFR BLD AUTO: 3.9 %
EOSINOPHILS ABSOLUTE: 0.2 THOU/MM3 (ref 0–0.4)
ERYTHROCYTE [DISTWIDTH] IN BLOOD BY AUTOMATED COUNT: 13.5 % (ref 11.5–14.5)
FERRITIN SERPL IA-MCNC: 64 NG/ML (ref 22–322)
FOLATE SERPL-MCNC: 16.2 NG/ML (ref 4.8–24.2)
GFR SERPL CREATININE-BSD FRML MDRD: 87 ML/MIN/1.73M2
GLUCOSE SERPL-MCNC: 106 MG/DL (ref 70–108)
HBA1C MFR BLD HPLC: 5.4 % (ref 4.4–6.4)
HCT VFR BLD AUTO: 45 % (ref 42–52)
HDLC SERPL-MCNC: 64 MG/DL
HGB BLD-MCNC: 15.2 GM/DL (ref 14–18)
HGB RETIC QN AUTO: 36.3 PG (ref 28.2–35.7)
IMM GRANULOCYTES # BLD AUTO: 0.02 THOU/MM3 (ref 0–0.07)
IMM GRANULOCYTES NFR BLD AUTO: 0.5 %
IMM RETICS NFR: 14.2 % (ref 2.3–13.4)
IRON SATN MFR SERPL: 35 % (ref 20–50)
IRON SERPL-MCNC: 134 UG/DL (ref 65–195)
LDLC SERPL CALC-MCNC: 120 MG/DL
LYMPHOCYTES ABSOLUTE: 0.7 THOU/MM3 (ref 1–4.8)
LYMPHOCYTES NFR BLD AUTO: 17.1 %
MCH RBC QN AUTO: 33.3 PG (ref 26–33)
MCHC RBC AUTO-ENTMCNC: 33.8 GM/DL (ref 32.2–35.5)
MCV RBC AUTO: 98.7 FL (ref 80–94)
MONOCYTES ABSOLUTE: 0.4 THOU/MM3 (ref 0.4–1.3)
MONOCYTES NFR BLD AUTO: 9.3 %
NEUTROPHILS ABSOLUTE: 2.9 THOU/MM3 (ref 1.8–7.7)
NEUTROPHILS NFR BLD AUTO: 68 %
NRBC BLD AUTO-RTO: 0 /100 WBC
PHOSPHATE SERPL-MCNC: 3.9 MG/DL (ref 2.4–4.7)
PLATELET # BLD AUTO: 203 THOU/MM3 (ref 130–400)
PMV BLD AUTO: 8.9 FL (ref 9.4–12.4)
POTASSIUM SERPL-SCNC: 4.1 MEQ/L (ref 3.5–5.2)
PROT SERPL-MCNC: 7.2 G/DL (ref 6.1–8)
RBC # BLD AUTO: 4.56 MILL/MM3 (ref 4.7–6.1)
RETICS # AUTO: 76 THOU/MM3 (ref 20–115)
RETICS/RBC NFR AUTO: 1.7 % (ref 0.5–2)
SODIUM SERPL-SCNC: 139 MEQ/L (ref 135–145)
TIBC SERPL-MCNC: 383 UG/DL (ref 171–450)
TRIGL SERPL-MCNC: 80 MG/DL (ref 0–199)
VIT B12 SERPL-MCNC: 535 PG/ML (ref 211–911)
WBC # BLD AUTO: 4.3 THOU/MM3 (ref 4.8–10.8)

## 2024-05-23 PROCEDURE — 83921 ORGANIC ACID SINGLE QUANT: CPT

## 2024-05-23 PROCEDURE — 83540 ASSAY OF IRON: CPT

## 2024-05-23 PROCEDURE — 82746 ASSAY OF FOLIC ACID SERUM: CPT

## 2024-05-23 PROCEDURE — 83036 HEMOGLOBIN GLYCOSYLATED A1C: CPT

## 2024-05-23 PROCEDURE — 85025 COMPLETE CBC W/AUTO DIFF WBC: CPT

## 2024-05-23 PROCEDURE — 82607 VITAMIN B-12: CPT

## 2024-05-23 PROCEDURE — 80053 COMPREHEN METABOLIC PANEL: CPT

## 2024-05-23 PROCEDURE — 36415 COLL VENOUS BLD VENIPUNCTURE: CPT

## 2024-05-23 PROCEDURE — 80061 LIPID PANEL: CPT

## 2024-05-23 PROCEDURE — 85046 RETICYTE/HGB CONCENTRATE: CPT

## 2024-05-23 PROCEDURE — 84100 ASSAY OF PHOSPHORUS: CPT

## 2024-05-23 PROCEDURE — 82728 ASSAY OF FERRITIN: CPT

## 2024-05-23 PROCEDURE — 83550 IRON BINDING TEST: CPT

## 2024-05-25 LAB — METHYLMALONATE SERPL-SCNC: NORMAL

## 2024-06-03 ENCOUNTER — OFFICE VISIT (OUTPATIENT)
Dept: ONCOLOGY | Age: 79
End: 2024-06-03
Payer: MEDICARE

## 2024-06-03 ENCOUNTER — TELEPHONE (OUTPATIENT)
Dept: ONCOLOGY | Age: 79
End: 2024-06-03

## 2024-06-03 ENCOUNTER — HOSPITAL ENCOUNTER (OUTPATIENT)
Dept: INFUSION THERAPY | Age: 79
Discharge: HOME | End: 2024-06-03
Payer: MEDICARE

## 2024-06-03 VITALS
SYSTOLIC BLOOD PRESSURE: 155 MMHG | HEART RATE: 69 BPM | WEIGHT: 212.8 LBS | TEMPERATURE: 98.1 F | DIASTOLIC BLOOD PRESSURE: 82 MMHG | OXYGEN SATURATION: 97 % | BODY MASS INDEX: 27.32 KG/M2 | RESPIRATION RATE: 16 BRPM

## 2024-06-03 VITALS
DIASTOLIC BLOOD PRESSURE: 82 MMHG | RESPIRATION RATE: 16 BRPM | SYSTOLIC BLOOD PRESSURE: 155 MMHG | OXYGEN SATURATION: 97 % | TEMPERATURE: 98.1 F | HEART RATE: 69 BPM

## 2024-06-03 DIAGNOSIS — D72.810 LYMPHOPENIA: ICD-10-CM

## 2024-06-03 DIAGNOSIS — D50.9 IRON DEFICIENCY ANEMIA, UNSPECIFIED IRON DEFICIENCY ANEMIA TYPE: Primary | ICD-10-CM

## 2024-06-03 PROCEDURE — 99214 OFFICE O/P EST MOD 30 MIN: CPT | Performed by: NURSE PRACTITIONER

## 2024-06-03 PROCEDURE — 1123F ACP DISCUSS/DSCN MKR DOCD: CPT | Performed by: NURSE PRACTITIONER

## 2024-06-03 PROCEDURE — 99211 OFF/OP EST MAY X REQ PHY/QHP: CPT

## 2024-06-03 NOTE — PROGRESS NOTES
[facility]ANS [place] [facility]  [facility] CANCER CENTER  [address] [facility]  [address]  [place] [address]  Dept: [phone]  Loc: [phone]   Hematology/Oncology Consult (Clinic)        06/04/24       Jamie Silverio   1945     Mai Whitmore APRN - *   Sonny Cardenas MD       Reason:   Chief Complaint   Patient presents with    Follow-up     Iron deficiency anemia, unspecified iron deficiency anemia type        HPI: Mr. Donte Beard is a 78-year-old male with a past medical history of erosive gastritis, impaired glucose tolerance, hyperlipidemia, hematochezia, erosive gastritis (on Nexium) iron deficiency anemia who presents for leukopenia.    OF NOTE: Patient had been admitted 4/9/2023 - 4/11/2023 for acute blood loss anemia due to upper GI bleed, status post left knee surgery 3/5/2023 in [place] approximately one month prior, was on Decadron 3/6/23-3/8/23, again Medrol pack 3/23/23-3/29/23 and Mobic 15 mg - one month, Keflex 500 3/6/23-3/12/23, ASA 3/6/23-4/6/23, was on Aleve regularly (did not take at the same time as Mobic) hemoglobin illicitly dropped down to 4 g, with positive fecal occult and melena, patient had symptomatic orthostasis, passed out and has a left periorbital fracture due to trauma from syncope.  Also had hydrocodone briefly one week post op.  -4/11/2023 iron 88, iron saturation 31%, TIBC 283, ferritin 108 ng/mL. (Collected after multiple blood transfusions).   -4/11/2023 Dr Kumari did EGD which showed erosive gastritis in the distal part of the body and antrum with no stigmata of acute GI bleed seen    -5/8/2023 patient had presented to his PCP Dr. Pleitez for follow-up for hyperlipidemia, and anemia had an appoint with GI on May 17, patient was on Nexium.  At that time his 4/11/2023 H&H was reviewed which was hemoglobin 9.3, hematocrit 29.7.  (Of note had normal hemoglobin and normal indices as of October 2022, normal hemoglobin with slightly macrocytic

## 2024-06-03 NOTE — TELEPHONE ENCOUNTER
CALLED PATIENT TO NOTIFY HIM ON THE PROVIDER CHANGE FOR HIS UPCOMING APPOINTMENT. PATIENT VOICED UNDERSTANDING AND APPROVED OF CHANGES.

## 2024-06-03 NOTE — PATIENT INSTRUCTIONS
CBC in 3 months due to low wbc. No need for follow up at that time.  Follow up in 6 months with labs  Please monitor for signs and symptoms of infection

## 2024-06-04 ASSESSMENT — ENCOUNTER SYMPTOMS: BACK PAIN: 1

## 2024-08-07 ENCOUNTER — OFFICE VISIT (OUTPATIENT)
Dept: FAMILY MEDICINE CLINIC | Age: 79
End: 2024-08-07
Payer: MEDICARE

## 2024-08-07 ENCOUNTER — HOSPITAL ENCOUNTER (OUTPATIENT)
Age: 79
Discharge: HOME | End: 2024-08-07
Payer: MEDICARE

## 2024-08-07 VITALS
DIASTOLIC BLOOD PRESSURE: 80 MMHG | WEIGHT: 211 LBS | BODY MASS INDEX: 27.09 KG/M2 | HEART RATE: 83 BPM | OXYGEN SATURATION: 98 % | RESPIRATION RATE: 20 BRPM | SYSTOLIC BLOOD PRESSURE: 136 MMHG | TEMPERATURE: 98 F

## 2024-08-07 DIAGNOSIS — R10.10 UPPER ABDOMINAL PAIN: ICD-10-CM

## 2024-08-07 DIAGNOSIS — K92.2 UPPER GI BLEED: ICD-10-CM

## 2024-08-07 DIAGNOSIS — K92.2 UPPER GI BLEED: Primary | ICD-10-CM

## 2024-08-07 DIAGNOSIS — R31.0 GROSS HEMATURIA: ICD-10-CM

## 2024-08-07 LAB
ALBUMIN SERPL BCG-MCNC: 4.5 G/DL (ref 3.5–5.1)
ALP SERPL-CCNC: 100 U/L (ref 38–126)
ALT SERPL W/O P-5'-P-CCNC: 15 U/L (ref 11–66)
ANION GAP SERPL CALC-SCNC: 12 MEQ/L (ref 8–16)
AST SERPL-CCNC: 25 U/L (ref 5–40)
BASOPHILS ABSOLUTE: 0 THOU/MM3 (ref 0–0.1)
BASOPHILS NFR BLD AUTO: 0.9 %
BILIRUB SERPL-MCNC: 0.4 MG/DL (ref 0.3–1.2)
BILIRUBIN, POC: NEGATIVE
BLOOD URINE, POC: NEGATIVE
BUN SERPL-MCNC: 17 MG/DL (ref 7–22)
CALCIUM SERPL-MCNC: 9.2 MG/DL (ref 8.5–10.5)
CHLORIDE SERPL-SCNC: 103 MEQ/L (ref 98–111)
CLARITY, POC: CLEAR
CO2 SERPL-SCNC: 26 MEQ/L (ref 23–33)
COLOR, POC: YELLOW
CREAT SERPL-MCNC: 0.9 MG/DL (ref 0.4–1.2)
DEPRECATED RDW RBC AUTO: 49.6 FL (ref 35–45)
EOSINOPHIL NFR BLD AUTO: 2.6 %
EOSINOPHILS ABSOLUTE: 0.1 THOU/MM3 (ref 0–0.4)
ERYTHROCYTE [DISTWIDTH] IN BLOOD BY AUTOMATED COUNT: 13.9 % (ref 11.5–14.5)
GFR SERPL CREATININE-BSD FRML MDRD: 87 ML/MIN/1.73M2
GLUCOSE SERPL-MCNC: 129 MG/DL (ref 70–108)
GLUCOSE URINE, POC: NEGATIVE
HCT VFR BLD AUTO: 35.3 % (ref 42–52)
HEMOCCULT STL QL: ABNORMAL
HEMOCCULT STL QL: ABNORMAL
HEMOCCULT STL QL: POSITIVE
HGB BLD-MCNC: 11.6 GM/DL (ref 14–18)
IMM GRANULOCYTES # BLD AUTO: 0.03 THOU/MM3 (ref 0–0.07)
IMM GRANULOCYTES NFR BLD AUTO: 0.6 %
KETONES, POC: NEGATIVE
LEUKOCYTE EST, POC: NEGATIVE
LYMPHOCYTES ABSOLUTE: 0.9 THOU/MM3 (ref 1–4.8)
LYMPHOCYTES NFR BLD AUTO: 16.8 %
MCH RBC QN AUTO: 32.4 PG (ref 26–33)
MCHC RBC AUTO-ENTMCNC: 32.9 GM/DL (ref 32.2–35.5)
MCV RBC AUTO: 98.6 FL (ref 80–94)
MONOCYTES ABSOLUTE: 0.4 THOU/MM3 (ref 0.4–1.3)
MONOCYTES NFR BLD AUTO: 7.3 %
NEUTROPHILS ABSOLUTE: 3.9 THOU/MM3 (ref 1.8–7.7)
NEUTROPHILS NFR BLD AUTO: 71.8 %
NITRITE, POC: NEGATIVE
NRBC BLD AUTO-RTO: 0 /100 WBC
PH, POC: 6
PLATELET # BLD AUTO: 242 THOU/MM3 (ref 130–400)
PMV BLD AUTO: 8.9 FL (ref 9.4–12.4)
POTASSIUM SERPL-SCNC: 4.2 MEQ/L (ref 3.5–5.2)
PROT SERPL-MCNC: 7.2 G/DL (ref 6.1–8)
PROTEIN, POC: NEGATIVE
RBC # BLD AUTO: 3.58 MILL/MM3 (ref 4.7–6.1)
SODIUM SERPL-SCNC: 141 MEQ/L (ref 135–145)
SPECIFIC GRAVITY, POC: 1.01
UROBILINOGEN, POC: 0.2
WBC # BLD AUTO: 5.4 THOU/MM3 (ref 4.8–10.8)

## 2024-08-07 PROCEDURE — 99214 OFFICE O/P EST MOD 30 MIN: CPT | Performed by: FAMILY MEDICINE

## 2024-08-07 PROCEDURE — 81003 URINALYSIS AUTO W/O SCOPE: CPT | Performed by: FAMILY MEDICINE

## 2024-08-07 PROCEDURE — 85025 COMPLETE CBC W/AUTO DIFF WBC: CPT

## 2024-08-07 PROCEDURE — 1123F ACP DISCUSS/DSCN MKR DOCD: CPT | Performed by: FAMILY MEDICINE

## 2024-08-07 PROCEDURE — 82270 OCCULT BLOOD FECES: CPT | Performed by: FAMILY MEDICINE

## 2024-08-07 PROCEDURE — 36415 COLL VENOUS BLD VENIPUNCTURE: CPT

## 2024-08-07 PROCEDURE — 80053 COMPREHEN METABOLIC PANEL: CPT

## 2024-08-07 RX ORDER — PANTOPRAZOLE SODIUM 40 MG/1
40 TABLET, DELAYED RELEASE ORAL
Qty: 30 TABLET | Refills: 0 | Status: SHIPPED | OUTPATIENT
Start: 2024-08-07

## 2024-08-07 SDOH — ECONOMIC STABILITY: INCOME INSECURITY: HOW HARD IS IT FOR YOU TO PAY FOR THE VERY BASICS LIKE FOOD, HOUSING, MEDICAL CARE, AND HEATING?: NOT HARD AT ALL

## 2024-08-07 SDOH — ECONOMIC STABILITY: FOOD INSECURITY: WITHIN THE PAST 12 MONTHS, THE FOOD YOU BOUGHT JUST DIDN'T LAST AND YOU DIDN'T HAVE MONEY TO GET MORE.: NEVER TRUE

## 2024-08-07 SDOH — ECONOMIC STABILITY: FOOD INSECURITY: WITHIN THE PAST 12 MONTHS, YOU WORRIED THAT YOUR FOOD WOULD RUN OUT BEFORE YOU GOT MONEY TO BUY MORE.: NEVER TRUE

## 2024-08-07 ASSESSMENT — ENCOUNTER SYMPTOMS
VOMITING: 0
ABDOMINAL PAIN: 1
NAUSEA: 0

## 2024-08-07 ASSESSMENT — PATIENT HEALTH QUESTIONNAIRE - PHQ9
SUM OF ALL RESPONSES TO PHQ QUESTIONS 1-9: 0
1. LITTLE INTEREST OR PLEASURE IN DOING THINGS: NOT AT ALL
SUM OF ALL RESPONSES TO PHQ QUESTIONS 1-9: 0
2. FEELING DOWN, DEPRESSED OR HOPELESS: NOT AT ALL
SUM OF ALL RESPONSES TO PHQ QUESTIONS 1-9: 0
SUM OF ALL RESPONSES TO PHQ9 QUESTIONS 1 & 2: 0
SUM OF ALL RESPONSES TO PHQ QUESTIONS 1-9: 0

## 2024-08-07 NOTE — PROGRESS NOTES
[facility] PROFESSIONAL [facility]  [address]  [facility] [address]  Dept: [phone]  Dept Fax: [phone]  Loc: [phone]  PROGRESS NOTE      Visit Date: 8/7/2024    Jamie Silverio is a 78 y.o. male who presents today for:  Chief Complaint   Patient presents with    Abdominal Pain    Stool Color Change       Chief complaint:  abd pain    Subjective:  Abdominal Pain  Pertinent negatives include no dysuria, fever, frequency, nausea or vomiting.       Black stool. Started 6 days ago with large BM with near black stool.  Drove to [place] last Friday.  Another large BM with brown/black stool on Saturday. Epigastric abd pain.  More heartburn.  Takes aleve when he has back pain.  Not on anticoagulants or antiplatelets. Hx of GI admission in April 2023 for acute anemia due to upper GI bleed.  EGD in 2023 shoed erosive gastritis.    Blood from tip of penis today about 1.5 hours ago.    Review of Systems   Constitutional:  Negative for chills and fever.   Gastrointestinal:  Positive for abdominal pain. Negative for nausea and vomiting.   Genitourinary:  Negative for dysuria, frequency and urgency.   Neurological:  Negative for dizziness, syncope and light-headedness.     Patient Active Problem List   Diagnosis    History of cold sores    Hyperlipidemia    Glucose intolerance (impaired glucose tolerance)    Pre-diabetes    Renal cysts, acquired, bilateral    Primary osteoarthritis of left knee    Erosive gastritis    Iron deficiency anemia    Other decreased white blood cell count     Past Medical History:   Diagnosis Date    Arthritis     Closed fracture of maxillary sinus (HCC) 4/19/2023    Erectile dysfunction     Glucose intolerance (impaired glucose tolerance)     Hyperlipidemia     Renal cyst, left 7-2001    Dr. Quevedo      Past Surgical History:   Procedure Laterality Date    COLONOSCOPY      KNEE ARTHROSCOPY Left     TONSILLECTOMY      TOTAL KNEE

## 2024-09-06 DIAGNOSIS — R10.10 UPPER ABDOMINAL PAIN: ICD-10-CM

## 2024-09-06 DIAGNOSIS — K92.2 UPPER GI BLEED: ICD-10-CM

## 2024-09-06 RX ORDER — PANTOPRAZOLE SODIUM 40 MG/1
40 TABLET, DELAYED RELEASE ORAL
Qty: 90 TABLET | Refills: 3 | Status: SHIPPED | OUTPATIENT
Start: 2024-09-06

## 2024-09-06 NOTE — TELEPHONE ENCOUNTER
Jamie Silverio called requesting a refill on the following medications:  Requested Prescriptions     Pending Prescriptions Disp Refills    pantoprazole (PROTONIX) 40 MG tablet 90 tablet 0     Sig: Take 1 tablet by mouth every morning (before breakfast)       Date of last visit: 8/7/2024  Date of next visit (if applicable):10/7/2024  Date of last refill: 8/7/24  Pharmacy Name: Brynn Aguilar LPN

## 2024-09-25 ENCOUNTER — OFFICE VISIT (OUTPATIENT)
Dept: FAMILY MEDICINE CLINIC | Age: 79
End: 2024-09-25

## 2024-09-25 VITALS
DIASTOLIC BLOOD PRESSURE: 72 MMHG | WEIGHT: 208 LBS | OXYGEN SATURATION: 98 % | RESPIRATION RATE: 18 BRPM | HEART RATE: 72 BPM | BODY MASS INDEX: 26.69 KG/M2 | HEIGHT: 74 IN | SYSTOLIC BLOOD PRESSURE: 122 MMHG

## 2024-09-25 DIAGNOSIS — M54.2 NECK PAIN, ACUTE: Primary | ICD-10-CM

## 2024-09-25 RX ORDER — METHYLPREDNISOLONE ACETATE 40 MG/ML
60 INJECTION, SUSPENSION INTRA-ARTICULAR; INTRALESIONAL; INTRAMUSCULAR; SOFT TISSUE ONCE
Status: COMPLETED | OUTPATIENT
Start: 2024-09-25 | End: 2024-09-25

## 2024-09-25 RX ADMIN — METHYLPREDNISOLONE ACETATE 60 MG: 40 INJECTION, SUSPENSION INTRA-ARTICULAR; INTRALESIONAL; INTRAMUSCULAR; SOFT TISSUE at 08:46

## 2024-09-25 ASSESSMENT — ENCOUNTER SYMPTOMS
VISUAL CHANGE: 0
TROUBLE SWALLOWING: 0
BACK PAIN: 0
PHOTOPHOBIA: 0

## 2024-09-30 ENCOUNTER — HOSPITAL ENCOUNTER (OUTPATIENT)
Dept: PHYSICAL THERAPY | Age: 79
Setting detail: THERAPIES SERIES
Discharge: HOME | End: 2024-09-30
Payer: MEDICARE

## 2024-09-30 PROCEDURE — 97162 PT EVAL MOD COMPLEX 30 MIN: CPT

## 2024-09-30 PROCEDURE — 97035 APP MDLTY 1+ULTRASOUND EA 15: CPT

## 2024-09-30 PROCEDURE — 97110 THERAPEUTIC EXERCISES: CPT

## 2024-09-30 NOTE — PROGRESS NOTES
pill given last week but only took 2 pills      Functional Outcome Measure Used Neck disability scale   Functional Outcome Score Eval 22 (9/30/24)       Insurance: Primary: Payor: [employer] /  /  / ,   Secondary:    Authorization Information   INSURANCE PAYS AT:  96% of allowable              PATIENT RESPONSIBILITY AND/OR CO-PAY:  4% of allowable  SECONDARY INSURANCE COMPANY:  NA      PRE CERTIFICATION REQUIRED: No precert required.  INSURANCE THERAPY BENEFIT: Visits approved based on medical necessity.. .   AQUATIC THERAPY COVERED: Yes  MODALITIES COVERED:  Yes   Initial CPT Codes Requested 09104 - Therapeutic Exercise, 41828 - Manual Therapy , 32173 - Ultrasound , and 08025 - Manual Electrical Stimulation   Progress Note Counter 1/10 for progress note (Reporting Period: 9/30/24 to     )   Recertification Date 11/11/2024   Physician Follow-Up 10/7/24 wellness visit with Dr. Cardenas   Physician Orders    History of Present Illness See pain started 1 week ago with insidious onset.  Patient to family MD , steroid injection given , pain pill given and PT ordered.  Has taken 2 pain pillos     SUBJECTIVE: see above    Social/Functional History and Current Status:  Patient lives with spouse in a single story home with a level surface to enter.    Task Previous Current   ADL’s  Independent Modified Independent increased neck and R UT/shoulder pain with putting shirt, coat on   IADL's Independent Modified Independent increased pain and difficulty sleeping, putting dishes away in higher shelf with increased pain   Ambulation Independent Modified Independent no increased pain walking   Transfers Independent Modified Independent   Recreation Independent Modified Independent enjoys playing pickleball with no increased neck/shoulder pain on R, normally plays 5 x per week but last with pain only able to play 1 x per week   Community Integration Independent Modified Independent   Driving Active  Active  increased

## 2024-10-02 ENCOUNTER — HOSPITAL ENCOUNTER (OUTPATIENT)
Dept: PHYSICAL THERAPY | Age: 79
Setting detail: THERAPIES SERIES
Discharge: HOME | End: 2024-10-02
Payer: MEDICARE

## 2024-10-02 PROCEDURE — 97530 THERAPEUTIC ACTIVITIES: CPT

## 2024-10-02 PROCEDURE — 97110 THERAPEUTIC EXERCISES: CPT

## 2024-10-02 PROCEDURE — 97035 APP MDLTY 1+ULTRASOUND EA 15: CPT

## 2024-10-02 NOTE — PROGRESS NOTES
[facility]  PHYSICAL THERAPY  [] EVALUATION  [x] DAILY NOTE (LAND) [] DAILY NOTE (AQUATIC ) [] PROGRESS NOTE [] DISCHARGE NOTE    [] [facility] [place]   [x] [facility]    [] [facility]   [] [facility]    Date: 10/2/2024  Patient Name:  Jamie Silverio  : 1945  MRN: [id number]  CSN: [id number]    Referring Practitioner Harriet Strickland, APRN - CNP [id number]      Diagnosis Cervicalgia   Treatment Diagnosis M54.2  Neck Pain  R53.1 Weakness  M62.81 Generalized Muscle Weakness   Date of Evaluation 24   Additional Pertinent History Jamie Silverio has a past medical history of Arthritis, Closed fracture of maxillary sinus, Erectile dysfunction, Glucose intolerance (impaired glucose tolerance), Hyperlipidemia, and Renal cyst, left.  he has a past surgical history that includes Tonsillectomy; Colonoscopy; Upper gastrointestinal endoscopy; Knee arthroscopy (Left); Upper gastrointestinal endoscopy (N/A, 2023); and Total knee arthroplasty (Left, 2023).     Allergies No Known Allergies   Medications   Current Outpatient Medications:     pantoprazole (PROTONIX) 40 MG tablet, Take 1 tablet by mouth every morning (before breakfast), Disp: 90 tablet, Rfl: 3    lovastatin (MEVACOR) 20 MG tablet, Take 1 tablet by mouth daily, Disp: 90 tablet, Rfl: 2  Pain pill given last week but only took 2 pills      Functional Outcome Measure Used Neck disability scale   Functional Outcome Score Eval 22 (24)       Insurance: Primary: Payor: [employer] /  /  / ,   Secondary:    Authorization Information   INSURANCE PAYS AT:  96% of allowable              PATIENT RESPONSIBILITY AND/OR CO-PAY:  4% of allowable  SECONDARY INSURANCE COMPANY:  NA      PRE CERTIFICATION REQUIRED: No precert required.  INSURANCE THERAPY BENEFIT: Visits approved based on medical necessity.. .   AQUATIC THERAPY COVERED: Yes  MODALITIES COVERED:  Yes

## 2024-10-06 SDOH — HEALTH STABILITY: PHYSICAL HEALTH: ON AVERAGE, HOW MANY DAYS PER WEEK DO YOU ENGAGE IN MODERATE TO STRENUOUS EXERCISE (LIKE A BRISK WALK)?: 6 DAYS

## 2024-10-06 SDOH — HEALTH STABILITY: PHYSICAL HEALTH: ON AVERAGE, HOW MANY MINUTES DO YOU ENGAGE IN EXERCISE AT THIS LEVEL?: 40 MIN

## 2024-10-06 ASSESSMENT — LIFESTYLE VARIABLES
HOW MANY STANDARD DRINKS CONTAINING ALCOHOL DO YOU HAVE ON A TYPICAL DAY: 1
HOW MANY STANDARD DRINKS CONTAINING ALCOHOL DO YOU HAVE ON A TYPICAL DAY: 1 OR 2
HOW OFTEN DO YOU HAVE A DRINK CONTAINING ALCOHOL: 4
HOW OFTEN DO YOU HAVE A DRINK CONTAINING ALCOHOL: 2-3 TIMES A WEEK
HOW OFTEN DO YOU HAVE SIX OR MORE DRINKS ON ONE OCCASION: 1

## 2024-10-06 ASSESSMENT — PATIENT HEALTH QUESTIONNAIRE - PHQ9
SUM OF ALL RESPONSES TO PHQ QUESTIONS 1-9: 0
SUM OF ALL RESPONSES TO PHQ QUESTIONS 1-9: 0
SUM OF ALL RESPONSES TO PHQ9 QUESTIONS 1 & 2: 0
SUM OF ALL RESPONSES TO PHQ QUESTIONS 1-9: 0
2. FEELING DOWN, DEPRESSED OR HOPELESS: NOT AT ALL
1. LITTLE INTEREST OR PLEASURE IN DOING THINGS: NOT AT ALL
SUM OF ALL RESPONSES TO PHQ QUESTIONS 1-9: 0

## 2024-10-07 ENCOUNTER — HOSPITAL ENCOUNTER (OUTPATIENT)
Dept: PHYSICAL THERAPY | Age: 79
Setting detail: THERAPIES SERIES
Discharge: HOME | End: 2024-10-07
Payer: MEDICARE

## 2024-10-07 ENCOUNTER — OFFICE VISIT (OUTPATIENT)
Dept: FAMILY MEDICINE CLINIC | Age: 79
End: 2024-10-07
Payer: MEDICARE

## 2024-10-07 VITALS
HEIGHT: 74 IN | WEIGHT: 207 LBS | DIASTOLIC BLOOD PRESSURE: 80 MMHG | HEART RATE: 70 BPM | BODY MASS INDEX: 26.56 KG/M2 | RESPIRATION RATE: 18 BRPM | SYSTOLIC BLOOD PRESSURE: 138 MMHG

## 2024-10-07 DIAGNOSIS — Z00.00 MEDICARE ANNUAL WELLNESS VISIT, SUBSEQUENT: Primary | ICD-10-CM

## 2024-10-07 DIAGNOSIS — E78.00 PURE HYPERCHOLESTEROLEMIA: ICD-10-CM

## 2024-10-07 DIAGNOSIS — Z23 INFLUENZA VACCINE NEEDED: ICD-10-CM

## 2024-10-07 PROCEDURE — 97035 APP MDLTY 1+ULTRASOUND EA 15: CPT

## 2024-10-07 PROCEDURE — G0439 PPPS, SUBSEQ VISIT: HCPCS | Performed by: FAMILY MEDICINE

## 2024-10-07 PROCEDURE — 97110 THERAPEUTIC EXERCISES: CPT

## 2024-10-07 PROCEDURE — 1123F ACP DISCUSS/DSCN MKR DOCD: CPT | Performed by: FAMILY MEDICINE

## 2024-10-07 PROCEDURE — 90653 IIV ADJUVANT VACCINE IM: CPT | Performed by: FAMILY MEDICINE

## 2024-10-07 PROCEDURE — G0008 ADMIN INFLUENZA VIRUS VAC: HCPCS | Performed by: FAMILY MEDICINE

## 2024-10-07 RX ORDER — LOVASTATIN 20 MG
20 TABLET ORAL DAILY
Qty: 90 TABLET | Refills: 2 | Status: SHIPPED | OUTPATIENT
Start: 2024-10-07

## 2024-10-07 NOTE — PROGRESS NOTES
[facility]  PHYSICAL THERAPY  [] EVALUATION  [x] DAILY NOTE (LAND) [] DAILY NOTE (AQUATIC ) [] PROGRESS NOTE [] DISCHARGE NOTE    [] [facility] [place]   [x] [facility]    [] [facility]   [] [facility]    Date: 10/7/2024  Patient Name:  Jamie Silverio  : 1945  MRN: [id number]  CSN: [id number]    Referring Practitioner Harriet Strickland, APRN - CNP [id number]      Diagnosis Cervicalgia   Treatment Diagnosis M54.2  Neck Pain  R53.1 Weakness  M62.81 Generalized Muscle Weakness   Date of Evaluation 24   Additional Pertinent History Jamie Silverio has a past medical history of Arthritis, Closed fracture of maxillary sinus, Erectile dysfunction, Glucose intolerance (impaired glucose tolerance), Hyperlipidemia, and Renal cyst, left.  he has a past surgical history that includes Tonsillectomy; Colonoscopy; Upper gastrointestinal endoscopy; Knee arthroscopy (Left); Upper gastrointestinal endoscopy (N/A, 2023); and Total knee arthroplasty (Left, 2023).     Allergies No Known Allergies   Medications   Current Outpatient Medications:     pantoprazole (PROTONIX) 40 MG tablet, Take 1 tablet by mouth every morning (before breakfast), Disp: 90 tablet, Rfl: 3    lovastatin (MEVACOR) 20 MG tablet, Take 1 tablet by mouth daily, Disp: 90 tablet, Rfl: 2  Pain pill given last week but only took 2 pills      Functional Outcome Measure Used Neck disability scale   Functional Outcome Score Eval 22 (24)       Insurance: Primary: Payor: [employer] /  /  / ,   Secondary:    Authorization Information   INSURANCE PAYS AT:  96% of allowable              PATIENT RESPONSIBILITY AND/OR CO-PAY:  4% of allowable  SECONDARY INSURANCE COMPANY:  NA      PRE CERTIFICATION REQUIRED: No precert required.  INSURANCE THERAPY BENEFIT: Visits approved based on medical necessity.. .   AQUATIC THERAPY COVERED: Yes  MODALITIES COVERED:  Yes

## 2024-10-07 NOTE — PROGRESS NOTES
Medicare Annual Wellness Visit    Jamie Silverio is here for Medicare AWV    Assessment & Plan   Medicare annual wellness visit, subsequent  Pure hypercholesterolemia  -     lovastatin (MEVACOR) 20 MG tablet; Take 1 tablet by mouth daily, Disp-90 tablet, R-2Normal  Influenza vaccine needed  -     Influenza, FLUAD Trivalent, (age 65 y+), IM, Preservative Free, 0.5mL    Refill lovastatin    Recommendations for Preventive Services Due: see orders and patient instructions/AVS.  Recommended screening schedule for the next 5-10 years is provided to the patient in written form: see Patient Instructions/AVS.     Return in about 1 year (around 10/8/2025) for medicare wellness.     Subjective       Neck pain.  Doing PT.    Feels good.     Patient's complete Health Risk Assessment and screening values have been reviewed and are found in Flowsheets. The following problems were reviewed today and where indicated follow up appointments were made and/or referrals ordered.    No Positive Risk Factors identified today.                                    Objective   Vitals:    10/07/24 1252   BP: 138/80   Site: Left Upper Arm   Position: Sitting   Pulse: 70   Resp: 18   Weight: 93.9 kg (207 lb)   Height: 1.88 m (6' 2\")      Body mass index is 26.58 kg/m².        Physical Exam  Vitals reviewed.   Constitutional:       General: He is not in acute distress.     Appearance: He is well-developed. He is not ill-appearing.   Cardiovascular:      Rate and Rhythm: Normal rate and regular rhythm.      Heart sounds: No murmur heard.  Pulmonary:      Effort: Pulmonary effort is normal. No respiratory distress.      Breath sounds: Normal breath sounds. No wheezing.   Musculoskeletal:      Right lower leg: No edema.      Left lower leg: No edema.   Neurological:      Mental Status: He is alert. Mental status is at baseline.   Psychiatric:         Mood and Affect: Mood normal.         Behavior: Behavior normal.                No Known

## 2024-10-07 NOTE — PATIENT INSTRUCTIONS
attack. These may include:    Chest pain or pressure, or a strange feeling in the chest.     Sweating.     Shortness of breath.     Pain, pressure, or a strange feeling in the back, neck, jaw, or upper belly or in one or both shoulders or arms.     Lightheadedness or sudden weakness.     A fast or irregular heartbeat.   After you call 911, the  may tell you to chew 1 adult-strength or 2 to 4 low-dose aspirin. Wait for an ambulance. Do not try to drive yourself.  Watch closely for changes in your health, and be sure to contact your doctor if you have any problems.  Where can you learn more?  Go to https://[email or web address].net/patientEd and enter F075 to learn more about \"A Healthy Heart: Care Instructions.\"  Current as of: June 24, 2023  Content Version: 14.2  © 2024 [email or web address].   Care instructions adapted under license by [employer]. If you have questions about a medical condition or this instruction, always ask your healthcare professional. [employer] disclaims any warranty or liability for your use of this information.      Personalized Preventive Plan for Jamie Silverio - 10/7/2024  Medicare offers a range of preventive health benefits. Some of the tests and screenings are paid in full while other may be subject to a deductible, co-insurance, and/or copay.    Some of these benefits include a comprehensive review of your medical history including lifestyle, illnesses that may run in your family, and various assessments and screenings as appropriate.    After reviewing your medical record and screening and assessments performed today your provider may have ordered immunizations, labs, imaging, and/or referrals for you.  A list of these orders (if applicable) as well as your Preventive Care list are included within your After Visit Summary for your review.    Other Preventive Recommendations:    A preventive eye exam performed by an eye specialist is recommended every 1-2 years

## 2024-10-09 ENCOUNTER — HOSPITAL ENCOUNTER (OUTPATIENT)
Dept: PHYSICAL THERAPY | Age: 79
Setting detail: THERAPIES SERIES
Discharge: HOME | End: 2024-10-09
Payer: MEDICARE

## 2024-10-09 PROCEDURE — 97035 APP MDLTY 1+ULTRASOUND EA 15: CPT

## 2024-10-09 PROCEDURE — 97110 THERAPEUTIC EXERCISES: CPT

## 2024-10-09 NOTE — PROGRESS NOTES
Initial CPT Codes Requested 66124 - Therapeutic Exercise, 12113 - Manual Therapy , 22400 - Ultrasound , and 23682 - Manual Electrical Stimulation   Progress Note Counter 4/10 for progress note (Reporting Period: 9/30/24 to     )   Recertification Date 11/20/2024   Physician Follow-Up 10/7/24 wellness visit with Dr. Cardenas   Physician Orders    History of Present Illness See pain started 1 week ago with insidious onset.  Patient to family MD , steroid injection given , pain pill given and PT ordered.  Has taken 2 pain pills     SUBJECTIVE: Patient reporting that pain is worse when first waking up 3/10, now after playing pickleball this AM, pain is better.       OBJECTIVE:    TREATMENT   Precautions: none   Pain: Neck 1-2/10, R UT to shoulder 1-2/10    \"X” in shaded column indicates activity completed today    “*\" next to exercise/intervention indicates progression   Modalities Parameters/  Location  Notes   US neck right UT for pain control 10', 100%, 1.0 x Pt seated               Manual Therapy Time/Technique  Notes                     Exercise/Intervention   Notes   Backward shoulder circles 15x  x    Scapular retraction 15 x 5s  x    Sitting cervical isometric lateral flexion R then L 8* x 5 seconds  x    Sitting cervical isometric retraction/flex  8* x 5s  x    Sitting AROM rotation, looking up and down 5x ea  x    Extension stretch over ball 10x5 sec  x    Cervical retractions with difficulty coordinating 10  x                                Low V doorway stretch shoulder stretch 3 x 10 s  x    Wall slides flex RUE 12* x  x    B shoulder ER and abduction with focus on scap and UT positioning 10 x  Orange* x    Small circles with ball on wall, UE at 90 degree, focusing on scap engagement and not using UT 10 cw/ccw  x    Theraband rows, extension, lat pull down and triceps extension* 10 orange x Cues to relax upper trap                   Specific Interventions Next Treatment: US neck/R UT for pain control , if

## 2024-10-14 ENCOUNTER — HOSPITAL ENCOUNTER (OUTPATIENT)
Dept: PHYSICAL THERAPY | Age: 79
Setting detail: THERAPIES SERIES
End: 2024-10-14
Payer: MEDICARE

## 2024-10-16 ENCOUNTER — HOSPITAL ENCOUNTER (OUTPATIENT)
Dept: PHYSICAL THERAPY | Age: 79
Setting detail: THERAPIES SERIES
Discharge: HOME | End: 2024-10-16
Payer: MEDICARE

## 2024-10-16 PROCEDURE — 97035 APP MDLTY 1+ULTRASOUND EA 15: CPT

## 2024-10-16 PROCEDURE — 97110 THERAPEUTIC EXERCISES: CPT

## 2024-10-16 NOTE — PROGRESS NOTES
[facility]  PHYSICAL THERAPY  [] EVALUATION  [x] DAILY NOTE (LAND) [] DAILY NOTE (AQUATIC ) [] PROGRESS NOTE [] DISCHARGE NOTE    [] [facility] [place]   [x] [place] [facility]    [] [facility]   [] [facility]    Date: 10/16/2024  Patient Name:  Jamie Silverio  : 1945  MRN: [id number]  CSN: [id number]    Referring Practitioner Harriet Strickland, APRN - CNP [id number]      Diagnosis Cervicalgia   Treatment Diagnosis M54.2  Neck Pain  R53.1 Weakness  M62.81 Generalized Muscle Weakness   Date of Evaluation 24   Additional Pertinent History Jmaie Silverio has a past medical history of Arthritis, Closed fracture of maxillary sinus, Erectile dysfunction, Glucose intolerance (impaired glucose tolerance), Hyperlipidemia, and Renal cyst, left.  he has a past surgical history that includes Tonsillectomy; Colonoscopy; Upper gastrointestinal endoscopy; Knee arthroscopy (Left); Upper gastrointestinal endoscopy (N/A, 2023); and Total knee arthroplasty (Left, 2023).     Allergies No Known Allergies   Medications   Current Outpatient Medications:     lovastatin (MEVACOR) 20 MG tablet, Take 1 tablet by mouth daily, Disp: 90 tablet, Rfl: 2    pantoprazole (PROTONIX) 40 MG tablet, Take 1 tablet by mouth every morning (before breakfast), Disp: 90 tablet, Rfl: 3  Pain pill given last week but only took 2 pills      Functional Outcome Measure Used Neck disability scale   Functional Outcome Score Eval 22 (24)       Insurance: Primary: Payor: [employer] /  /  / ,   Secondary:    Authorization Information   INSURANCE PAYS AT:  96% of allowable              PATIENT RESPONSIBILITY AND/OR CO-PAY:  4% of allowable  SECONDARY INSURANCE COMPANY:  NA      PRE CERTIFICATION REQUIRED: No precert required.  INSURANCE THERAPY BENEFIT: Visits approved based on medical necessity.. .   AQUATIC THERAPY COVERED: Yes  MODALITIES COVERED:  Yes

## 2024-10-21 ENCOUNTER — APPOINTMENT (OUTPATIENT)
Dept: PHYSICAL THERAPY | Age: 79
End: 2024-10-21
Payer: MEDICARE

## 2024-10-28 ENCOUNTER — APPOINTMENT (OUTPATIENT)
Dept: PHYSICAL THERAPY | Age: 79
End: 2024-10-28
Payer: MEDICARE

## 2024-10-30 ENCOUNTER — APPOINTMENT (OUTPATIENT)
Dept: PHYSICAL THERAPY | Age: 79
End: 2024-10-30
Payer: MEDICARE

## 2024-12-02 ENCOUNTER — HOSPITAL ENCOUNTER (OUTPATIENT)
Dept: INFUSION THERAPY | Age: 79
Discharge: HOME | End: 2024-12-02
Payer: MEDICARE

## 2024-12-02 ENCOUNTER — OFFICE VISIT (OUTPATIENT)
Dept: ONCOLOGY | Age: 79
End: 2024-12-02
Payer: MEDICARE

## 2024-12-02 VITALS
SYSTOLIC BLOOD PRESSURE: 149 MMHG | HEART RATE: 72 BPM | TEMPERATURE: 98.1 F | OXYGEN SATURATION: 96 % | BODY MASS INDEX: 28.32 KG/M2 | DIASTOLIC BLOOD PRESSURE: 71 MMHG | WEIGHT: 220.6 LBS | RESPIRATION RATE: 16 BRPM

## 2024-12-02 VITALS
OXYGEN SATURATION: 96 % | RESPIRATION RATE: 16 BRPM | TEMPERATURE: 98.1 F | HEART RATE: 72 BPM | SYSTOLIC BLOOD PRESSURE: 149 MMHG | DIASTOLIC BLOOD PRESSURE: 71 MMHG

## 2024-12-02 DIAGNOSIS — D64.9 NORMOCYTIC ANEMIA: ICD-10-CM

## 2024-12-02 DIAGNOSIS — D50.9 IRON DEFICIENCY ANEMIA, UNSPECIFIED IRON DEFICIENCY ANEMIA TYPE: Primary | ICD-10-CM

## 2024-12-02 DIAGNOSIS — D50.9 IRON DEFICIENCY ANEMIA, UNSPECIFIED IRON DEFICIENCY ANEMIA TYPE: ICD-10-CM

## 2024-12-02 DIAGNOSIS — D50.0 IRON DEFICIENCY ANEMIA DUE TO CHRONIC BLOOD LOSS: ICD-10-CM

## 2024-12-02 DIAGNOSIS — D72.810 LYMPHOPENIA: ICD-10-CM

## 2024-12-02 LAB
BASOPHILS ABSOLUTE: 0 THOU/MM3 (ref 0–0.1)
BASOPHILS NFR BLD AUTO: 1 % (ref 0–3)
EOSINOPHIL NFR BLD AUTO: 2 % (ref 0–4)
EOSINOPHILS ABSOLUTE: 0.1 THOU/MM3 (ref 0–0.4)
ERYTHROCYTE [DISTWIDTH] IN BLOOD BY AUTOMATED COUNT: 14.9 % (ref 11.5–14.5)
FERRITIN SERPL IA-MCNC: 24 NG/ML (ref 22–322)
HCT VFR BLD AUTO: 37 % (ref 42–52)
HGB BLD-MCNC: 11.7 GM/DL (ref 14–18)
IMMATURE GRANULOCYTES %: 0 %
IMMATURE GRANULOCYTES ABSOLUTE: 0.01 THOU/MM3 (ref 0–0.07)
IRON SERPL-MCNC: 58 UG/DL (ref 65–195)
LYMPHOCYTES ABSOLUTE: 0.9 THOU/MM3 (ref 1–4.8)
LYMPHOCYTES NFR BLD AUTO: 20 % (ref 15–47)
MCH RBC QN AUTO: 28.5 PG (ref 26–33)
MCHC RBC AUTO-ENTMCNC: 31.6 GM/DL (ref 32.2–35.5)
MCV RBC AUTO: 90 FL (ref 80–94)
MONOCYTES ABSOLUTE: 0.4 THOU/MM3 (ref 0.4–1.3)
MONOCYTES NFR BLD AUTO: 9 % (ref 0–12)
NEUTROPHILS ABSOLUTE: 3 THOU/MM3 (ref 1.8–7.7)
NEUTROPHILS NFR BLD AUTO: 68 % (ref 43–75)
PLATELET # BLD AUTO: 171 THOU/MM3 (ref 130–400)
PMV BLD AUTO: 8.1 FL (ref 9.4–12.4)
RBC # BLD AUTO: 4.11 MILL/MM3 (ref 4.7–6.1)
TIBC SERPL-MCNC: 422 UG/DL (ref 171–450)
VIT B12 SERPL-MCNC: 380 PG/ML (ref 211–911)
WBC # BLD AUTO: 4.5 THOU/MM3 (ref 4.8–10.8)

## 2024-12-02 PROCEDURE — 83540 ASSAY OF IRON: CPT

## 2024-12-02 PROCEDURE — 1159F MED LIST DOCD IN RCRD: CPT | Performed by: PHYSICIAN ASSISTANT

## 2024-12-02 PROCEDURE — 82728 ASSAY OF FERRITIN: CPT

## 2024-12-02 PROCEDURE — 85025 COMPLETE CBC W/AUTO DIFF WBC: CPT

## 2024-12-02 PROCEDURE — 83550 IRON BINDING TEST: CPT

## 2024-12-02 PROCEDURE — 36415 COLL VENOUS BLD VENIPUNCTURE: CPT

## 2024-12-02 PROCEDURE — 99211 OFF/OP EST MAY X REQ PHY/QHP: CPT

## 2024-12-02 PROCEDURE — 1126F AMNT PAIN NOTED NONE PRSNT: CPT | Performed by: PHYSICIAN ASSISTANT

## 2024-12-02 PROCEDURE — 1160F RVW MEDS BY RX/DR IN RCRD: CPT | Performed by: PHYSICIAN ASSISTANT

## 2024-12-02 PROCEDURE — 1123F ACP DISCUSS/DSCN MKR DOCD: CPT | Performed by: PHYSICIAN ASSISTANT

## 2024-12-02 PROCEDURE — 99213 OFFICE O/P EST LOW 20 MIN: CPT | Performed by: PHYSICIAN ASSISTANT

## 2024-12-02 PROCEDURE — 82607 VITAMIN B-12: CPT

## 2024-12-02 NOTE — PATIENT INSTRUCTIONS
Return to clinic in 3 months  Labs on RTC: CBC, ferritin, iron, TIBC, folate and vitamin B12   Please call for questions or concerns.     No

## 2024-12-02 NOTE — PROGRESS NOTES
[facility] [address], [address]  [facility] [address]  Dept: [phone]  Dept Fax: [phone] Loc: [phone]      Visit Date:12/2/2024     Jamie Silverio is a 79 y.o. male who presents today for:   Chief Complaint   Patient presents with    Follow-up     Iron deficiency anemia, unspecified iron deficiency anemia type        HPI:   Jamie Silverio is a 79 y.o. male followed in the office for iron deficiency anemia. Per prior note:    past medical history of erosive gastritis, impaired glucose tolerance, hyperlipidemia, hematochezia, erosive gastritis (on Nexium) iron deficiency anemia who presents for leukopenia.     OF NOTE: Patient had been admitted 4/9/2023 - 4/11/2023 for acute blood loss anemia due to upper GI bleed, status post left knee surgery 3/5/2023 in [place] approximately one month prior, was on Decadron 3/6/23-3/8/23, again Medrol pack 3/23/23-3/29/23 and Mobic 15 mg - one month, Keflex 500 3/6/23-3/12/23, ASA 3/6/23-4/6/23, was on Aleve regularly (did not take at the same time as Mobic) hemoglobin illicitly dropped down to 4 g, with positive fecal occult and melena, patient had symptomatic orthostasis, passed out and has a left periorbital fracture due to trauma from syncope.  Also had hydrocodone briefly one week post op.  -4/11/2023 iron 88, iron saturation 31%, TIBC 283, ferritin 108 ng/mL. (Collected after multiple blood transfusions).   -4/11/2023 Dr Kumari did EGD which showed erosive gastritis in the distal part of the body and antrum with no stigmata of acute GI bleed seen     -5/8/2023 patient had presented to his PCP Dr. Pleitez for follow-up for hyperlipidemia, and anemia had an appoint with GI on May 17, patient was on Nexium.  At that time his 4/11/2023 H&H was reviewed which was hemoglobin 9.3, hematocrit 29.7.  (Of note had normal hemoglobin and normal indices as of October 2022, normal hemoglobin with slightly macrocytic indices

## 2025-03-03 ENCOUNTER — OFFICE VISIT (OUTPATIENT)
Dept: ONCOLOGY | Age: 80
End: 2025-03-03
Payer: MEDICARE

## 2025-03-03 ENCOUNTER — HOSPITAL ENCOUNTER (OUTPATIENT)
Dept: INFUSION THERAPY | Age: 80
Discharge: HOME | End: 2025-03-03
Payer: MEDICARE

## 2025-03-03 VITALS
OXYGEN SATURATION: 96 % | HEART RATE: 85 BPM | RESPIRATION RATE: 18 BRPM | TEMPERATURE: 97.5 F | BODY MASS INDEX: 26.31 KG/M2 | DIASTOLIC BLOOD PRESSURE: 78 MMHG | HEIGHT: 74 IN | SYSTOLIC BLOOD PRESSURE: 149 MMHG | WEIGHT: 205 LBS

## 2025-03-03 VITALS
BODY MASS INDEX: 26.31 KG/M2 | WEIGHT: 205 LBS | DIASTOLIC BLOOD PRESSURE: 78 MMHG | HEART RATE: 85 BPM | HEIGHT: 74 IN | SYSTOLIC BLOOD PRESSURE: 149 MMHG | OXYGEN SATURATION: 96 % | RESPIRATION RATE: 18 BRPM | TEMPERATURE: 97.5 F

## 2025-03-03 DIAGNOSIS — D64.9 NORMOCYTIC ANEMIA: ICD-10-CM

## 2025-03-03 DIAGNOSIS — D50.9 IRON DEFICIENCY ANEMIA, UNSPECIFIED IRON DEFICIENCY ANEMIA TYPE: Primary | ICD-10-CM

## 2025-03-03 DIAGNOSIS — D72.810 LYMPHOPENIA: ICD-10-CM

## 2025-03-03 DIAGNOSIS — D50.0 IRON DEFICIENCY ANEMIA DUE TO CHRONIC BLOOD LOSS: ICD-10-CM

## 2025-03-03 LAB
BASOPHILS ABSOLUTE: 0 THOU/MM3 (ref 0–0.1)
BASOPHILS NFR BLD AUTO: 0 % (ref 0–3)
EOSINOPHIL NFR BLD AUTO: 2 % (ref 0–4)
EOSINOPHILS ABSOLUTE: 0.1 THOU/MM3 (ref 0–0.4)
ERYTHROCYTE [DISTWIDTH] IN BLOOD BY AUTOMATED COUNT: 14 % (ref 11.5–14.5)
FERRITIN SERPL IA-MCNC: 49 NG/ML (ref 30–400)
FOLATE SERPL-MCNC: 10.8 NG/ML (ref 4.6–34.8)
HCT VFR BLD AUTO: 42.3 % (ref 42–52)
HGB BLD-MCNC: 14.3 GM/DL (ref 14–18)
IMMATURE GRANULOCYTES %: 0 %
IMMATURE GRANULOCYTES ABSOLUTE: 0.02 THOU/MM3 (ref 0–0.07)
IRON SERPL-MCNC: 85 UG/DL (ref 61–157)
LYMPHOCYTES ABSOLUTE: 1.1 THOU/MM3 (ref 1–4.8)
LYMPHOCYTES NFR BLD AUTO: 22 % (ref 15–47)
MCH RBC QN AUTO: 31.4 PG (ref 26–33)
MCHC RBC AUTO-ENTMCNC: 33.8 GM/DL (ref 32.2–35.5)
MCV RBC AUTO: 93 FL (ref 80–94)
MONOCYTES ABSOLUTE: 0.4 THOU/MM3 (ref 0.4–1.3)
MONOCYTES NFR BLD AUTO: 8 % (ref 0–12)
NEUTROPHILS ABSOLUTE: 3.3 THOU/MM3 (ref 1.8–7.7)
NEUTROPHILS NFR BLD AUTO: 68 % (ref 43–75)
PLATELET # BLD AUTO: 201 THOU/MM3 (ref 130–400)
PMV BLD AUTO: 8.3 FL (ref 9.4–12.4)
RBC # BLD AUTO: 4.55 MILL/MM3 (ref 4.7–6.1)
TIBC SERPL-MCNC: 385 UG/DL (ref 171–450)
VIT B12 SERPL-MCNC: 565 PG/ML (ref 232–1245)
WBC # BLD AUTO: 4.9 THOU/MM3 (ref 4.8–10.8)

## 2025-03-03 PROCEDURE — 99211 OFF/OP EST MAY X REQ PHY/QHP: CPT

## 2025-03-03 PROCEDURE — 1123F ACP DISCUSS/DSCN MKR DOCD: CPT | Performed by: NURSE PRACTITIONER

## 2025-03-03 PROCEDURE — 85025 COMPLETE CBC W/AUTO DIFF WBC: CPT

## 2025-03-03 PROCEDURE — 83540 ASSAY OF IRON: CPT

## 2025-03-03 PROCEDURE — 82728 ASSAY OF FERRITIN: CPT

## 2025-03-03 PROCEDURE — 82607 VITAMIN B-12: CPT

## 2025-03-03 PROCEDURE — 82746 ASSAY OF FOLIC ACID SERUM: CPT

## 2025-03-03 PROCEDURE — 99214 OFFICE O/P EST MOD 30 MIN: CPT | Performed by: NURSE PRACTITIONER

## 2025-03-03 PROCEDURE — 1159F MED LIST DOCD IN RCRD: CPT | Performed by: NURSE PRACTITIONER

## 2025-03-03 PROCEDURE — 1160F RVW MEDS BY RX/DR IN RCRD: CPT | Performed by: NURSE PRACTITIONER

## 2025-03-03 PROCEDURE — 83550 IRON BINDING TEST: CPT

## 2025-03-03 PROCEDURE — 1126F AMNT PAIN NOTED NONE PRSNT: CPT | Performed by: NURSE PRACTITIONER

## 2025-03-03 PROCEDURE — 36415 COLL VENOUS BLD VENIPUNCTURE: CPT

## 2025-03-03 NOTE — PROGRESS NOTES
dysplasia and malignancy.  H. pylori negative.  Iron deficiency found and patient started on oral iron plus MVI daily.  H/H improved to 14.3/42.3.  MCV 93.  Iron studies pending.  Await lab results.    2. Lymphopenia  Most likely secondary to chronic alcohol use.  Denies B type symptoms.  He started oral iron plus MVI daily.  WBC improved today at 4.9.  Trend.    No follow-ups on file.   Will call with results.    All patient questions answered. Pt voiced understanding. Patient agreed with treatment plan. Follow up as directed. Patient instructed to call for questions or concerns.        NOTE: This report was transcribed using voice recognition software.  Every effort was made to ensure accuracy; however, inadvertent computerized transcription errors may be present.    Electronically signed by   GUDELIA Gray CNP     Addendum:  Our office is having phone issues.  Sent MyChart message to pt that labs are stable and ok to trend in 3-6 mos per his preference.  He is instructed to call our office to schedule a follow up appnt.    Electronically signed by GUDELIA Gray CNP on 3/4/2025 at 11:09 AM

## 2025-05-22 DIAGNOSIS — E78.00 PURE HYPERCHOLESTEROLEMIA: ICD-10-CM

## 2025-05-22 RX ORDER — LOVASTATIN 20 MG/1
20 TABLET ORAL DAILY
Qty: 90 TABLET | Refills: 0 | Status: SHIPPED | OUTPATIENT
Start: 2025-05-22

## 2025-05-22 NOTE — TELEPHONE ENCOUNTER
Jamie Silverio called requesting a refill on the following medications:  Requested Prescriptions     Pending Prescriptions Disp Refills    lovastatin (MEVACOR) 20 MG tablet 90 tablet 2     Sig: Take 1 tablet by mouth daily       Date of last visit: 10/7/2024  Date of next visit (if applicable):Visit date not found  Date of last refill: 10/7/24  Pharm[facility] Name: [facility],  Brynn Sinclair LPN    *patient picked up refill approx 3 weeks ago but lost the bottle, requesting new script*

## 2025-08-21 DIAGNOSIS — E78.00 PURE HYPERCHOLESTEROLEMIA: ICD-10-CM

## 2025-08-21 RX ORDER — LOVASTATIN 20 MG/1
20 TABLET ORAL DAILY
Qty: 90 TABLET | Refills: 0 | Status: SHIPPED | OUTPATIENT
Start: 2025-08-21